# Patient Record
Sex: MALE | Race: WHITE | NOT HISPANIC OR LATINO | Employment: UNEMPLOYED | URBAN - METROPOLITAN AREA
[De-identification: names, ages, dates, MRNs, and addresses within clinical notes are randomized per-mention and may not be internally consistent; named-entity substitution may affect disease eponyms.]

---

## 2018-01-16 ENCOUNTER — HOSPITAL ENCOUNTER (INPATIENT)
Facility: HOSPITAL | Age: 23
LOS: 6 days | Discharge: HOME | DRG: 885 | End: 2018-01-22
Attending: EMERGENCY MEDICINE | Admitting: PSYCHIATRY & NEUROLOGY
Payer: COMMERCIAL

## 2018-01-16 DIAGNOSIS — F39 MOOD DISORDER (HCC): ICD-10-CM

## 2018-01-16 DIAGNOSIS — K92.1 BLOOD IN STOOL: ICD-10-CM

## 2018-01-16 DIAGNOSIS — I89.9 PERIPHERAL LYMPHATIC DISEASE OR SYNDROME: ICD-10-CM

## 2018-01-16 DIAGNOSIS — F32.A DEPRESSION WITH SUICIDAL IDEATION: ICD-10-CM

## 2018-01-16 DIAGNOSIS — K59.00 CONSTIPATION, UNSPECIFIED CONSTIPATION TYPE: ICD-10-CM

## 2018-01-16 DIAGNOSIS — F17.200 NICOTINE DEPENDENCE: ICD-10-CM

## 2018-01-16 DIAGNOSIS — B17.10 ACUTE HEPATITIS C VIRUS INFECTION WITHOUT HEPATIC COMA: ICD-10-CM

## 2018-01-16 DIAGNOSIS — M62.838 MUSCLE SPASM: ICD-10-CM

## 2018-01-16 DIAGNOSIS — R45.851 DEPRESSION WITH SUICIDAL IDEATION: ICD-10-CM

## 2018-01-16 DIAGNOSIS — F32.A DEPRESSION: ICD-10-CM

## 2018-01-16 DIAGNOSIS — R52 PAIN: ICD-10-CM

## 2018-01-16 DIAGNOSIS — F33.2 SEVERE EPISODE OF RECURRENT MAJOR DEPRESSIVE DISORDER, WITHOUT PSYCHOTIC FEATURES (HCC): Primary | ICD-10-CM

## 2018-01-16 LAB
AMPHETAMINES SERPL QL SCN: NEGATIVE
BARBITURATES UR QL: NEGATIVE
BENZODIAZ UR QL: NEGATIVE
COCAINE UR QL: NEGATIVE
ETHANOL EXG-MCNC: 0 MG/DL
METHADONE UR QL: NEGATIVE
OPIATES UR QL SCN: NEGATIVE
PCP UR QL: NEGATIVE
THC UR QL: NEGATIVE

## 2018-01-16 PROCEDURE — 99285 EMERGENCY DEPT VISIT HI MDM: CPT

## 2018-01-16 PROCEDURE — 82075 ASSAY OF BREATH ETHANOL: CPT | Performed by: EMERGENCY MEDICINE

## 2018-01-16 PROCEDURE — 80307 DRUG TEST PRSMV CHEM ANLYZR: CPT | Performed by: EMERGENCY MEDICINE

## 2018-01-16 RX ORDER — CLONIDINE HYDROCHLORIDE 0.3 MG/1
0.3 TABLET ORAL 2 TIMES DAILY
COMMUNITY
End: 2018-01-22 | Stop reason: HOSPADM

## 2018-01-16 RX ORDER — NICOTINE 21 MG/24HR
1 PATCH, TRANSDERMAL 24 HOURS TRANSDERMAL DAILY
Status: DISCONTINUED | OUTPATIENT
Start: 2018-01-17 | End: 2018-01-22 | Stop reason: HOSPADM

## 2018-01-16 RX ORDER — ACETAMINOPHEN 325 MG/1
650 TABLET ORAL EVERY 6 HOURS PRN
Status: DISCONTINUED | OUTPATIENT
Start: 2018-01-16 | End: 2018-01-16 | Stop reason: SDUPTHER

## 2018-01-16 RX ORDER — POLYETHYLENE GLYCOL 3350 17 G/17G
17 POWDER, FOR SOLUTION ORAL DAILY
COMMUNITY
End: 2018-01-22 | Stop reason: HOSPADM

## 2018-01-16 RX ORDER — IBUPROFEN 600 MG/1
600 TABLET ORAL EVERY 8 HOURS PRN
Status: DISCONTINUED | OUTPATIENT
Start: 2018-01-16 | End: 2018-01-22 | Stop reason: HOSPADM

## 2018-01-16 RX ORDER — MAGNESIUM HYDROXIDE/ALUMINUM HYDROXICE/SIMETHICONE 120; 1200; 1200 MG/30ML; MG/30ML; MG/30ML
30 SUSPENSION ORAL EVERY 4 HOURS PRN
Status: DISCONTINUED | OUTPATIENT
Start: 2018-01-16 | End: 2018-01-22 | Stop reason: HOSPADM

## 2018-01-16 RX ORDER — BUPRENORPHINE HYDROCHLORIDE AND NALOXONE HYDROCHLORIDE DIHYDRATE 8; 2 MG/1; MG/1
1 TABLET SUBLINGUAL DAILY
COMMUNITY
End: 2018-01-22 | Stop reason: HOSPADM

## 2018-01-16 RX ORDER — HALOPERIDOL 5 MG
5 TABLET ORAL EVERY 6 HOURS PRN
Status: DISCONTINUED | OUTPATIENT
Start: 2018-01-16 | End: 2018-01-17

## 2018-01-16 RX ORDER — LORAZEPAM 1 MG/1
1 TABLET ORAL EVERY 6 HOURS PRN
Status: DISCONTINUED | OUTPATIENT
Start: 2018-01-16 | End: 2018-01-17

## 2018-01-16 RX ORDER — GABAPENTIN 800 MG/1
800 TABLET ORAL 3 TIMES DAILY
Status: ON HOLD | COMMUNITY
End: 2018-01-22

## 2018-01-16 RX ORDER — IBUPROFEN 400 MG/1
800 TABLET ORAL EVERY 8 HOURS PRN
Status: DISCONTINUED | OUTPATIENT
Start: 2018-01-16 | End: 2018-01-22 | Stop reason: HOSPADM

## 2018-01-16 RX ORDER — TRAZODONE HYDROCHLORIDE 50 MG/1
50 TABLET ORAL
Status: DISCONTINUED | OUTPATIENT
Start: 2018-01-16 | End: 2018-01-17

## 2018-01-16 RX ORDER — HALOPERIDOL 5 MG/ML
5 INJECTION INTRAMUSCULAR EVERY 6 HOURS PRN
Status: DISCONTINUED | OUTPATIENT
Start: 2018-01-16 | End: 2018-01-17

## 2018-01-16 RX ORDER — METHOCARBAMOL 750 MG/1
750 TABLET, FILM COATED ORAL EVERY 4 HOURS
COMMUNITY
End: 2018-01-22 | Stop reason: HOSPADM

## 2018-01-16 RX ORDER — ACETAMINOPHEN 325 MG/1
650 TABLET ORAL EVERY 6 HOURS PRN
Status: DISCONTINUED | OUTPATIENT
Start: 2018-01-16 | End: 2018-01-17

## 2018-01-16 RX ADMIN — LORAZEPAM 1 MG: 1 TABLET ORAL at 21:37

## 2018-01-16 RX ADMIN — IBUPROFEN 600 MG: 600 TABLET ORAL at 21:58

## 2018-01-16 NOTE — ED ATTENDING ATTESTATION
Jarod Adame MD, saw and evaluated the patient  I have discussed the patient with the resident/non-physician practitioner and agree with the resident's/non-physician practitioner's findings, Plan of Care, and MDM as documented in the resident's/non-physician practitioner's note, except where noted  All available labs and Radiology studies were reviewed  At this point I agree with the current assessment done in the Emergency Department  I have conducted an independent evaluation of this patient including a focused history and a physical exam     72-year-old male presenting to the emergency department for evaluation of suicidal ideation  Previous suicidal attempts x2  Patient is planned this time was to rapid sheet around his neck which she did yesterday but then aborted his attempt  Patient has access to firearms  High risk for suicidality  Recommend crisis referral for admission

## 2018-01-16 NOTE — ED NOTES
Pt came to the ED from [facility] drug addiction [address] residential [address]  The pt has lived their for about 1 week  The pt was abusing pain killers and Benzos-Valium about 100 mg a day  The pt last use was about a week ago  The pt states that he has increase depression recently  The pt can't identify any stressors  The pt has been so depressed that he has lost 15 lbs in the past month from not eating  The pt is SI with a plan to hang himself  The pt last night made noose and put it around in his neck as SA gesture  The pt denies HI/AH/VH  The pt states that if he were to be DC he would definitely hurt himself  The pt offered and signed a 201  Wil Sox in agreement

## 2018-01-17 PROBLEM — R59.1 LYMPHADENOPATHY: Status: ACTIVE | Noted: 2018-01-17

## 2018-01-17 PROBLEM — B19.20 HEPATITIS C: Status: ACTIVE | Noted: 2018-01-17

## 2018-01-17 PROBLEM — F13.10 BENZODIAZEPINE ABUSE (HCC): Status: ACTIVE | Noted: 2018-01-17

## 2018-01-17 PROBLEM — F33.2 SEVERE EPISODE OF RECURRENT MAJOR DEPRESSIVE DISORDER, WITHOUT PSYCHOTIC FEATURES (HCC): Status: ACTIVE | Noted: 2018-01-17

## 2018-01-17 PROBLEM — K92.1 BLOODY STOOLS: Status: ACTIVE | Noted: 2018-01-17

## 2018-01-17 LAB
ALBUMIN SERPL BCP-MCNC: 4.1 G/DL (ref 3.5–5)
ALP SERPL-CCNC: 91 U/L (ref 46–116)
ALT SERPL W P-5'-P-CCNC: 650 U/L (ref 12–78)
ANION GAP SERPL CALCULATED.3IONS-SCNC: 6 MMOL/L (ref 4–13)
AST SERPL W P-5'-P-CCNC: 212 U/L (ref 5–45)
BASOPHILS # BLD AUTO: 0.02 THOUSANDS/ΜL (ref 0–0.1)
BASOPHILS NFR BLD AUTO: 0 % (ref 0–1)
BILIRUB SERPL-MCNC: 0.86 MG/DL (ref 0.2–1)
BILIRUB UR QL STRIP: ABNORMAL
BUN SERPL-MCNC: 12 MG/DL (ref 5–25)
CALCIUM SERPL-MCNC: 9.7 MG/DL (ref 8.3–10.1)
CHLORIDE SERPL-SCNC: 104 MMOL/L (ref 100–108)
CHOLEST SERPL-MCNC: 152 MG/DL (ref 50–200)
CLARITY UR: CLEAR
CO2 SERPL-SCNC: 30 MMOL/L (ref 21–32)
COLOR UR: YELLOW
CREAT SERPL-MCNC: 0.95 MG/DL (ref 0.6–1.3)
EOSINOPHIL # BLD AUTO: 0.12 THOUSAND/ΜL (ref 0–0.61)
EOSINOPHIL NFR BLD AUTO: 2 % (ref 0–6)
ERYTHROCYTE [DISTWIDTH] IN BLOOD BY AUTOMATED COUNT: 12.7 % (ref 11.6–15.1)
GFR SERPL CREATININE-BSD FRML MDRD: 114 ML/MIN/1.73SQ M
GLUCOSE P FAST SERPL-MCNC: 86 MG/DL (ref 65–99)
GLUCOSE SERPL-MCNC: 86 MG/DL (ref 65–140)
GLUCOSE UR STRIP-MCNC: NEGATIVE MG/DL
HCT VFR BLD AUTO: 44.4 % (ref 36.5–49.3)
HDLC SERPL-MCNC: 52 MG/DL (ref 40–60)
HGB BLD-MCNC: 15.7 G/DL (ref 12–17)
HGB UR QL STRIP.AUTO: NEGATIVE
KETONES UR STRIP-MCNC: NEGATIVE MG/DL
LDLC SERPL CALC-MCNC: 78 MG/DL (ref 0–100)
LEUKOCYTE ESTERASE UR QL STRIP: NEGATIVE
LYMPHOCYTES # BLD AUTO: 1.73 THOUSANDS/ΜL (ref 0.6–4.47)
LYMPHOCYTES NFR BLD AUTO: 35 % (ref 14–44)
MCH RBC QN AUTO: 32.9 PG (ref 26.8–34.3)
MCHC RBC AUTO-ENTMCNC: 35.4 G/DL (ref 31.4–37.4)
MCV RBC AUTO: 93 FL (ref 82–98)
MONOCYTES # BLD AUTO: 0.49 THOUSAND/ΜL (ref 0.17–1.22)
MONOCYTES NFR BLD AUTO: 10 % (ref 4–12)
NEUTROPHILS # BLD AUTO: 2.54 THOUSANDS/ΜL (ref 1.85–7.62)
NEUTS SEG NFR BLD AUTO: 53 % (ref 43–75)
NITRITE UR QL STRIP: NEGATIVE
NRBC BLD AUTO-RTO: 0 /100 WBCS
PH UR STRIP.AUTO: 6 [PH] (ref 4.5–8)
PLATELET # BLD AUTO: 210 THOUSANDS/UL (ref 149–390)
PMV BLD AUTO: 9.2 FL (ref 8.9–12.7)
POTASSIUM SERPL-SCNC: 4.2 MMOL/L (ref 3.5–5.3)
PROT SERPL-MCNC: 8.2 G/DL (ref 6.4–8.2)
PROT UR STRIP-MCNC: NEGATIVE MG/DL
RBC # BLD AUTO: 4.77 MILLION/UL (ref 3.88–5.62)
SODIUM SERPL-SCNC: 140 MMOL/L (ref 136–145)
SP GR UR STRIP.AUTO: 1.02 (ref 1–1.03)
TRIGL SERPL-MCNC: 109 MG/DL
TSH SERPL DL<=0.05 MIU/L-ACNC: 1.3 UIU/ML (ref 0.36–3.74)
UROBILINOGEN UR QL STRIP.AUTO: 1 E.U./DL
WBC # BLD AUTO: 4.91 THOUSAND/UL (ref 4.31–10.16)

## 2018-01-17 PROCEDURE — 81003 URINALYSIS AUTO W/O SCOPE: CPT | Performed by: PSYCHIATRY & NEUROLOGY

## 2018-01-17 PROCEDURE — 86592 SYPHILIS TEST NON-TREP QUAL: CPT | Performed by: PSYCHIATRY & NEUROLOGY

## 2018-01-17 PROCEDURE — 85025 COMPLETE CBC W/AUTO DIFF WBC: CPT | Performed by: PSYCHIATRY & NEUROLOGY

## 2018-01-17 PROCEDURE — 84443 ASSAY THYROID STIM HORMONE: CPT | Performed by: PSYCHIATRY & NEUROLOGY

## 2018-01-17 PROCEDURE — 80053 COMPREHEN METABOLIC PANEL: CPT | Performed by: PSYCHIATRY & NEUROLOGY

## 2018-01-17 PROCEDURE — 80061 LIPID PANEL: CPT | Performed by: PSYCHIATRY & NEUROLOGY

## 2018-01-17 PROCEDURE — 86308 HETEROPHILE ANTIBODY SCREEN: CPT | Performed by: PSYCHIATRY & NEUROLOGY

## 2018-01-17 RX ORDER — GABAPENTIN 400 MG/1
800 CAPSULE ORAL 3 TIMES DAILY
Status: DISCONTINUED | OUTPATIENT
Start: 2018-01-17 | End: 2018-01-22 | Stop reason: HOSPADM

## 2018-01-17 RX ORDER — DIPHENHYDRAMINE HCL 25 MG
25 TABLET ORAL
Status: DISCONTINUED | OUTPATIENT
Start: 2018-01-17 | End: 2018-01-22 | Stop reason: HOSPADM

## 2018-01-17 RX ORDER — CLONIDINE HYDROCHLORIDE 0.3 MG/1
0.3 TABLET ORAL
Status: DISCONTINUED | OUTPATIENT
Start: 2018-01-17 | End: 2018-01-22 | Stop reason: HOSPADM

## 2018-01-17 RX ORDER — METHOCARBAMOL 750 MG/1
750 TABLET, FILM COATED ORAL EVERY 4 HOURS
Status: DISCONTINUED | OUTPATIENT
Start: 2018-01-17 | End: 2018-01-17

## 2018-01-17 RX ORDER — LANOLIN ALCOHOL/MO/W.PET/CERES
3 CREAM (GRAM) TOPICAL
Status: DISCONTINUED | OUTPATIENT
Start: 2018-01-17 | End: 2018-01-22 | Stop reason: HOSPADM

## 2018-01-17 RX ORDER — CARBAMAZEPINE 200 MG/1
200 TABLET ORAL 2 TIMES DAILY
Status: DISCONTINUED | OUTPATIENT
Start: 2018-01-17 | End: 2018-01-22 | Stop reason: HOSPADM

## 2018-01-17 RX ORDER — CLONIDINE HYDROCHLORIDE 0.1 MG/1
0.1 TABLET ORAL 2 TIMES DAILY
Status: DISCONTINUED | OUTPATIENT
Start: 2018-01-17 | End: 2018-01-17

## 2018-01-17 RX ORDER — METHOCARBAMOL 750 MG/1
750 TABLET, FILM COATED ORAL EVERY 4 HOURS PRN
Status: DISCONTINUED | OUTPATIENT
Start: 2018-01-17 | End: 2018-01-20

## 2018-01-17 RX ORDER — CLONIDINE HYDROCHLORIDE 0.3 MG/1
0.15 TABLET ORAL 2 TIMES DAILY
Status: DISCONTINUED | OUTPATIENT
Start: 2018-01-17 | End: 2018-01-22 | Stop reason: HOSPADM

## 2018-01-17 RX ORDER — LORAZEPAM 1 MG/1
1 TABLET ORAL EVERY 4 HOURS PRN
Status: DISCONTINUED | OUTPATIENT
Start: 2018-01-17 | End: 2018-01-22 | Stop reason: HOSPADM

## 2018-01-17 RX ORDER — POLYETHYLENE GLYCOL 3350 17 G/17G
17 POWDER, FOR SOLUTION ORAL DAILY
Status: DISCONTINUED | OUTPATIENT
Start: 2018-01-17 | End: 2018-01-20

## 2018-01-17 RX ORDER — DULOXETIN HYDROCHLORIDE 20 MG/1
20 CAPSULE, DELAYED RELEASE ORAL DAILY
Status: DISCONTINUED | OUTPATIENT
Start: 2018-01-17 | End: 2018-01-19

## 2018-01-17 RX ORDER — OLANZAPINE 10 MG/1
10 INJECTION, POWDER, LYOPHILIZED, FOR SOLUTION INTRAMUSCULAR
Status: DISCONTINUED | OUTPATIENT
Start: 2018-01-17 | End: 2018-01-22 | Stop reason: HOSPADM

## 2018-01-17 RX ORDER — IBUPROFEN 400 MG/1
400 TABLET ORAL EVERY 8 HOURS PRN
Status: DISCONTINUED | OUTPATIENT
Start: 2018-01-17 | End: 2018-01-22 | Stop reason: HOSPADM

## 2018-01-17 RX ORDER — OLANZAPINE 10 MG/1
10 TABLET, ORALLY DISINTEGRATING ORAL
Status: DISCONTINUED | OUTPATIENT
Start: 2018-01-17 | End: 2018-01-22 | Stop reason: HOSPADM

## 2018-01-17 RX ORDER — ONDANSETRON 4 MG/1
4 TABLET, ORALLY DISINTEGRATING ORAL EVERY 6 HOURS PRN
Status: DISCONTINUED | OUTPATIENT
Start: 2018-01-17 | End: 2018-01-22 | Stop reason: HOSPADM

## 2018-01-17 RX ADMIN — IBUPROFEN 600 MG: 600 TABLET ORAL at 21:43

## 2018-01-17 RX ADMIN — GABAPENTIN 800 MG: 400 CAPSULE ORAL at 21:13

## 2018-01-17 RX ADMIN — DULOXETINE HYDROCHLORIDE 20 MG: 20 CAPSULE, DELAYED RELEASE ORAL at 13:50

## 2018-01-17 RX ADMIN — METHOCARBAMOL 750 MG: 750 TABLET ORAL at 19:05

## 2018-01-17 RX ADMIN — GABAPENTIN 800 MG: 400 CAPSULE ORAL at 15:45

## 2018-01-17 RX ADMIN — CARBAMAZEPINE 200 MG: 200 TABLET ORAL at 13:50

## 2018-01-17 RX ADMIN — NICOTINE 1 PATCH: 21 PATCH, EXTENDED RELEASE TRANSDERMAL at 10:10

## 2018-01-17 RX ADMIN — CLONIDINE HYDROCHLORIDE 0.15 MG: 0.3 TABLET ORAL at 13:53

## 2018-01-17 RX ADMIN — POLYETHYLENE GLYCOL 3350 17 G: 17 POWDER, FOR SOLUTION ORAL at 13:13

## 2018-01-17 RX ADMIN — CLONIDINE HYDROCHLORIDE 0.3 MG: 0.3 TABLET ORAL at 21:15

## 2018-01-17 RX ADMIN — MELATONIN TAB 3 MG 3 MG: 3 TAB at 21:13

## 2018-01-17 RX ADMIN — CLONIDINE HYDROCHLORIDE 0.15 MG: 0.3 TABLET ORAL at 18:24

## 2018-01-17 NOTE — ED NOTES
Pt told CW that his Social security# is   CW checked EVS and pt is not eligible for MA  CW completed pre-cert with [employer] private insurance at [phone]   Wally Mikey stated that the [facility] is in network with this insurance  Care manager Cal Silveira approved 3 days of IP [employer] [facility] [place] 1/16/18 to 1/18/18  Review is on 1/18/18 has to be done between 800 am to 5 pm  AUTH#[id number]

## 2018-01-17 NOTE — CONSULTS
Consult- Ebony Horn 1995, 25 y o  male MRN: [id number]    Unit/Bed#: SC5 305-39 Encounter: [id number]    Primary Care Provider: Ирина Pop MD   Date and time admitted to hospital: 1/16/2018  4:35 PM      Inpatient consult to Internal Medicine  Consult performed by: Cinthia Grimm ordered by: Daryn Abdul          Hepatitis C   Assessment & Plan    · Recommended outpatient GI referral  · Stable        Bloody stools   Assessment & Plan    · Patient states he has had this for multiple years  · Suspect hemorrhoidal  · Hemoglobin stable  · Check FOBT  · Outpatient follow up with PCP/GI        Lymphadenopathy   Assessment & Plan    · Present for last few months  · Do not suspect acute illness - mono testing ordered by behavioral health, but doubt at this point  · Family doctor was in the process of an outpatient workup - would have family doctor continue workup as outpatient  · No further intervention required as inpatient  Benzodiazepine abuse   Assessment & Plan    · Per primary team        * Severe episode of recurrent major depressive disorder, without psychotic features [facility])   Assessment & Plan    · Per primary team              Recommendations for Discharge:  · Needs follow up with family doctor and referral to GI specialist    Counseling / Coordination of Care Time: 45 minutes  Greater than 50% of total time spent on patient counseling and coordination of care  Collaboration of Care: Were Recommendations Directly Discussed with Primary Treatment Team? - No     History of Present Illness:    Ebony Horn is a 25 y o  male who is originally admitted to the behavioral health service on 1/16/2018 due to SI  We are consulted for bloody stools, hepatitis C, and neck lymphadenopathy  Patient states he has bloody stools for years  Bright red blood  He states he was evaluated before and no blood was found  Patinet also noting 3 lymph nodes on his neck   His family doctor suggested a workup, but the patient states he did not do it yet  Patient also has a known history of Hepatitis C with mildly elevated LFT's  Review of Systems:    Review of Systems   Constitutional: Negative for activity change, appetite change, chills, diaphoresis, fatigue and fever  HENT: Negative for rhinorrhea, sinus pressure, sneezing and sore throat  Eyes: Negative for photophobia, redness and visual disturbance  Respiratory: Negative for cough, chest tightness, shortness of breath and wheezing  Cardiovascular: Negative for chest pain, palpitations and leg swelling  Gastrointestinal: Positive for blood in stool  Negative for abdominal distention, abdominal pain, anal bleeding, constipation, diarrhea, nausea and vomiting  Genitourinary: Negative for difficulty urinating, dysuria, flank pain, frequency, hematuria and urgency  Musculoskeletal: Negative for back pain and gait problem  Skin: Negative for rash and wound  Allergic/Immunologic: Negative for immunocompromised state  Neurological: Negative for dizziness, tremors, seizures, syncope, facial asymmetry, speech difficulty, weakness, light-headedness, numbness and headaches  Hematological: Negative for adenopathy  Psychiatric/Behavioral: Positive for dysphoric mood  Negative for agitation, confusion and hallucinations  The patient is not nervous/anxious  Past Medical and Surgical History:     Past Medical History:   Diagnosis Date    Addiction to drug ([facility] [address] )     Anxiety     Chronic pain disorder     Depression     Heart disease     pt states "valvular heart disease"    Hepatitis C 1/17/2018    Muscle spasm     Psychiatric illness     Sciatica     Scoliosis     Sleep difficulties     Substance abuse     Suicide attempt        Past Surgical History:   Procedure Laterality Date    WISDOM TOOTH EXTRACTION         Meds/Allergies:    all medications and allergies reviewed    Allergies:    Allergies   Allergen Reactions    Seroquel [Quetiapine] Tachycardia    Trazodone Tachycardia       Social History:     Marital Status: Single    Substance Use History:   History   Alcohol Use No     History   Smoking Status    Current Every Day Smoker    Packs/day: 1 00   Smokeless Tobacco    Never Used     History   Drug Use    Types: Heroin, Benzodiazepines, Other     Comment: stopped using benzo and prescription pain medication Jan 2018       Family History:    History reviewed  No pertinent family history  Physical Exam:     Vitals:   Blood Pressure: 122/84 (01/17/18 1353)  Pulse: 81 (01/17/18 1105)  Temperature: 97 7 °F (36 5 °C) (01/17/18 0844)  Temp Source: Oral (01/17/18 0844)  Respirations: 16 (01/17/18 0844)  Height: 6' (182 9 cm) (01/16/18 2018)  Weight - Scale: 68 kg (150 lb) (01/16/18 2018)  SpO2: 100 % (01/16/18 1819)    Physical Exam   Constitutional: He is oriented to person, place, and time  He appears well-developed  No distress  HENT:   Head: Normocephalic and atraumatic  Neck: Normal range of motion  Neck supple  Cardiovascular: Normal rate and regular rhythm  No murmur heard  Pulmonary/Chest: Effort normal and breath sounds normal  No respiratory distress  He has no wheezes  He has no rales  Abdominal: Soft  Bowel sounds are normal  He exhibits no distension  Musculoskeletal: He exhibits no edema  Lymphadenopathy:     He has cervical adenopathy (2 cerviacal nodes on left and 1 on right  Non-tender to palpation)  Neurological: He is alert and oriented to person, place, and time  No cranial nerve deficit  Skin: Skin is warm and dry  No rash noted  Psychiatric: He has a normal mood and affect  Additional Data:     Lab Results: I have personally reviewed pertinent reports          Results from last 7 days  Lab Units 01/17/18  0557   WBC Thousand/uL 4 91   HEMOGLOBIN g/dL 15 7   HEMATOCRIT % 44 4   PLATELETS Thousands/uL 210   NEUTROS PCT % 53   LYMPHS PCT % 35   MONOS PCT % 10   EOS PCT % 2       Results from last 7 days  Lab Units 01/17/18  0557   SODIUM mmol/L 140   POTASSIUM mmol/L 4 2   CHLORIDE mmol/L 104   CO2 mmol/L 30   BUN mg/dL 12   CREATININE mg/dL 0 95   CALCIUM mg/dL 9 7   TOTAL PROTEIN g/dL 8 2   BILIRUBIN TOTAL mg/dL 0 86   ALK PHOS U/L 91   ALT U/L 650*   AST U/L 212*   GLUCOSE RANDOM mg/dL 86             No results found for: HGBA1C    Imaging: I have personally reviewed pertinent reports  No orders to display       EKG, Pathology, and Other Studies Reviewed on Admission:       ** Please Note: This note has been constructed using a voice recognition system   **

## 2018-01-17 NOTE — PROGRESS NOTES
Patient currently denies SI  Reports depression  States that he has had past admissions for depression  Currently residing at [facility]- but states that he is not going to return  He and his father will be finding a new rehab for patient  " That place just isn't for me"  Compliant with medications, pleasant

## 2018-01-17 NOTE — PROGRESS NOTES
While going through Pt's belongings, a broken piece of glass was found in his coat pocket  The piece was roughly an inch in diameter with jagged edges on all sides  The glass was put in the sharps container and nurse was informed

## 2018-01-17 NOTE — CONSULTS
Consult   Wisam Araya 25 y o  male MRN: [id number]  Unit/Bed#: SG5 213-02 Encounter: [id number]    History of Present Illness     HPI:  Wisam Araya is a 25 y o  male who presents with depression and suicidal ideations           Review of Systems   Constitutional: Negative  HENT: Negative  Eyes: Negative  Respiratory: Negative  Cardiovascular: Negative  Gastrointestinal: Negative  Endocrine: Negative  Genitourinary: Negative  Musculoskeletal: Negative  Skin: Negative  Allergic/Immunologic: Negative  Neurological: Negative  Hematological: Negative  Psychiatric/Behavioral: Positive for confusion, decreased concentration, dysphoric mood and suicidal ideas  Historical Information   Past Medical History:   Diagnosis Date    Addiction to drug ([address] )     Anxiety     Chronic pain disorder     Depression     Heart disease     pt states "valvular heart disease"    Muscle spasm     Psychiatric illness     Sciatica     Scoliosis     Sleep difficulties     Substance abuse     Suicide attempt      Past Surgical History:   Procedure Laterality Date    WISDOM TOOTH EXTRACTION       Social History   History   Alcohol Use No     History   Drug Use    Types: Heroin, Benzodiazepines, Other     Comment: stopped using benzo and prescription pain medication Jan 2018     History   Smoking Status    Current Every Day Smoker    Packs/day: 1 00   Smokeless Tobacco    Never Used     Family History: History reviewed  No pertinent family history      Meds/Allergies   Current Facility-Administered Medications   Medication Dose Route Frequency    acetaminophen (TYLENOL) tablet 650 mg  650 mg Oral Q6H PRN    aluminum-magnesium hydroxide-simethicone (MYLANTA) 200-200-20 mg/5 mL oral suspension 30 mL  30 mL Oral Q4H PRN    haloperidol (HALDOL) tablet 5 mg  5 mg Oral Q6H PRN    haloperidol lactate (HALDOL) injection 5 mg  5 mg Intramuscular Q6H PRN    ibuprofen (MOTRIN) tablet 600 mg  600 mg Oral Q8H PRN    ibuprofen (MOTRIN) tablet 800 mg  800 mg Oral Q8H PRN    LORazepam (ATIVAN) tablet 1 mg  1 mg Oral Q6H PRN    magnesium hydroxide (MILK OF MAGNESIA) 400 mg/5 mL oral suspension 30 mL  30 mL Oral Daily PRN    [START ON 1/17/2018] nicotine (NICODERM CQ) 21 mg/24 hr TD 24 hr patch 1 patch  1 patch Transdermal Daily    traZODone (DESYREL) tablet 50 mg  50 mg Oral HS PRN     Prescriptions Prior to Admission   Medication    buprenorphine-naloxone (SUBOXONE) 8-2 mg per SL tablet    cloNIDine (CATAPRES) 0 3 mg tablet    gabapentin (NEURONTIN) 800 mg tablet    methocarbamol (ROBAXIN) 750 mg tablet    polyethylene glycol (MIRALAX) 17 g packet     Allergies   Allergen Reactions    Seroquel [Quetiapine] Tachycardia    Trazodone Tachycardia       Objective     /69   Pulse (!) 52   Temp 97 9 °F (36 6 °C) (Oral)   Resp 16   Ht 6' (1 829 m)   Wt 68 kg (150 lb)   SpO2 100%   BMI 20 34 kg/m²     Current Vitals:   Blood Pressure: 124/69 (01/16/18 2018)  Pulse: (!) 52 (01/16/18 2018)  Temperature: 97 9 °F (36 6 °C) (01/16/18 2018)  Temp Source: Oral (01/16/18 2018)  Respirations: 16 (01/16/18 2018)  Height: 6' (182 9 cm) (01/16/18 2018)  Weight - Scale: 68 kg (150 lb) (01/16/18 2018)  SpO2: 100 % (01/16/18 1819)    No intake or output data in the 24 hours ending 01/16/18 2155    Invasive Devices          No matching active lines, drains, or airways          Physical Exam   Constitutional: He is oriented to person, place, and time  He appears well-developed and well-nourished  No distress  HENT:   Head: Normocephalic and atraumatic  Right Ear: External ear normal    Left Ear: External ear normal    Nose: Nose normal    Mouth/Throat: Oropharynx is clear and moist    Eyes: Conjunctivae and EOM are normal  Pupils are equal, round, and reactive to light  Neck: Normal range of motion  Neck supple  No JVD present  No tracheal deviation present  No thyromegaly present  Cardiovascular: Normal rate, regular rhythm, normal heart sounds and intact distal pulses  Exam reveals no gallop and no friction rub  No murmur heard  Pulmonary/Chest: Effort normal and breath sounds normal  No stridor  No respiratory distress  He has no wheezes  He has no rales  He exhibits no tenderness  Abdominal: Soft  Bowel sounds are normal  He exhibits no distension and no mass  There is no tenderness  There is no rebound and no guarding  Genitourinary:   Genitourinary Comments: Deferred    Musculoskeletal: Normal range of motion  He exhibits no edema, tenderness or deformity  Lymphadenopathy:     He has no cervical adenopathy  Neurological: He is alert and oriented to person, place, and time  He has normal reflexes  He displays normal reflexes  No cranial nerve deficit  He exhibits normal muscle tone  Coordination normal    Skin: Skin is warm and dry  No rash noted  He is not diaphoretic  No erythema  No pallor  Psychiatric: His mood appears anxious  His affect is blunt  His speech is delayed and slurred  He is slowed and withdrawn  Cognition and memory are impaired  He expresses impulsivity  He exhibits a depressed mood  He expresses suicidal ideation  Lab Results:   Admission on 01/16/2018   Component Date Value    Amph/Meth UR 01/16/2018 Negative     Barbiturate Ur 01/16/2018 Negative     Benzodiazepine Urine 01/16/2018 Negative     Cocaine Urine 01/16/2018 Negative     Methadone Urine 01/16/2018 Negative     Opiate Urine 01/16/2018 Negative     PCP Ur 01/16/2018 Negative     THC Urine 01/16/2018 Negative     EXTBreath Alcohol 01/16/2018 0 000        EKG, Pathology, and Other Studies: I have personally reviewed pertinent reports  Assessment/Plan     Assessment:  1  Depression and suicidal ideations  Plan:  1  Admit for Psych eval/treatment          Counseling / Coordination of Care  Total floor / unit time spent today 1 hour  Greater than 50% of total time was spent with the patient and / or family counseling and / or coordination of care        Batsheva Stallings PA-C  1/16/2018

## 2018-01-17 NOTE — ASSESSMENT & PLAN NOTE
· Present for last few months  · Do not suspect acute illness - mono testing ordered by behavioral health, but doubt at this point  · Family doctor was in the process of an outpatient workup - would have family doctor continue workup as outpatient  · No further intervention required as inpatient

## 2018-01-17 NOTE — ED NOTES
Pt has been accepted to [facility] by Dr Patrick Bassett    Nurse to nurse report needs to be called to [facility] at [phone]

## 2018-01-17 NOTE — CASE MANAGEMENT
Pt presented to [facility] on a 201 due to increased depression with ideations to hang himself; he reports an extensive psychiatric history since the age of 16 and polysubstance abuse  Pt states his father, whom he had lived with, dropped him off at [facility] and insisted he gets help for addiction problems that range from prescription opiod and benzo abuse to herion - as per pt  Pt is hepatitis C positive  Pt states he became a drug addict at age 13 when he was first prescribed clonopin and it felt so "wonderful" to him and took away all the feelings he hated and he therefore became an addict on benzos and opioid pain killers (favorite is percocet)  Pt's father is a retired  and "pulls strings" at times to benefit pt (as per pt)  Pt states his household contains guns, father advised to secure guns and/or remove guns  Pt states he will not return to [facility], that it was a bad environment and no help whatsoever and that his father is finding him another place to go after his discharge

## 2018-01-17 NOTE — PROGRESS NOTES
Pt about unit and social with peers  Affect remains blunted  Pt denies SI, remains depressed  Denies and w/d symptoms  Education on new medications given and pt reading and encouraged to come to RN with any questions  Compliant with meds and care  Will provide hat to obtain stool sample for occult blood as per medical team  Will monitor

## 2018-01-17 NOTE — H&P
Psychiatric Evaluation - Behavioral Health     Identification Data:Robert Cutler 25 y o  male MRN: [id number]  Unit/Bed#: BM6 866-51 Encounter: [id number]    Chief Complaint: "I wanted to die", "I don't want to go on living like this", depression and suicidal ideation    History of Present Illness     Dariusz Capone is a 25 y o  male with a history of Major Depressive Disorder, psychosis, anxiety, self-abusive behavior, substance use and personality disorder who was admitted to the inpatient psychiatric unit on a voluntary 201 commitment basis due to depression, anxiety, substance abuse, inability to care for self because of mental illness, suicidal ideation and suicidal ideation with plan to hang self  Symptoms prior to admission included worsening depression, depression, feeling depressed, suicidal ideation, hopelessness, helplessness, poor concentration, poor appetite, weight loss and difficulty sleeping  Onset of symptoms was abrupt starting 1 week ago with progressively worsening course since that time  Stressors preceding admission included drug use problems  On initial evaluation after admission to the inpatient psychiatric unit the patient depressed, anxious, had restricted affect telling that in addition to his depression he also suffered from mild withdrawal from Suboxone his stop several days ago after he was admitted to [facility]rug [facility] alcohol residential program   The patient did not like that program over and relapsed on benzodiazepines  The patient stated that he used to bite different benzodiazepines from the Internet and recall completed the strained and dosages to adjust to volume but the patient know how to how much to use to get high but not overdosing  The patient also stated had history of heroin addiction    Had 1 suicidal attempt in the past by OD on benzos and prescriprionn medications and after 24 hours observtions was referred to outpatient psych at    Prescription opioids - lost control since 24 y o  No other drugs - opioid and benzodiazepine  Natural high - a couple of day a month   Depression improved on pain killer  Insomnia since 14 y,o  Clonidine 0 3 hs, 0 15 bid  Neurontin 800 mg tid -     Back pain - muscle spasm syndrome  Robaxin  coreyretre - 10 a day  High school grad  2-3 jihs - in the last two years  Not working  Psychiatric Review Of Systems:    sleep changes: decreased  appetite changes: decreased  weight changes: decreased  energy/anergy: decreased  interest/pleasure/anhedonia: decreased  somatic symptoms: no  anxiety/panic: yes  jose: history of mood swings  guilty/hopeless: yes  self injurious behavior/risky behavior: yes  Suicidal ideation: yes, plan to hang self  Homicidal ideation: no  Auditory hallucinations: past auditory hallucinations  Visual hallucinations: no  Other hallucinations: no  Delusional thinking: no  Eating disorder history: no  Obsessive/compulsive symptoms: no    Historical Information     Past Psychiatric History:     Past Inpatient Psychiatric Treatment:   One past inpatient psychiatric admission  Past Outpatient Psychiatric Treatment:    Was in outpatient psychiatric treatment in the past with a psychiatrist   Past Suicide Attempts:    yes, by overdose, street drugs [place] and benzos  Past Violent Behavior:    no  Past Psychiatric Medication Trials:    multiple psychiatric medication trials   Psychiatrist - for three years: prozac, celexa, welbutrin, elavil, paxil, Effexor, Voilent and aggressive on antidepressant  Easily agitted  razadoen an seroquel - tachicarda  MUcle lock up  Heart disease  No cymbalta  Father - Curlene Spatz       Substance Abuse History:  Social History     Tobacco History     Smoking Status  Current Every Day Smoker Smoking Frequency  1 pack/day    Smokeless Tobacco Use  Never Used          Alcohol History     Alcohol Use Status  No          Drug Use     Drug Use Status  Yes Types  Benzodiazepines, Heroin, Other Comment  stopped using benzo and prescription pain medication Jan 2018          Sexual Activity     Sexually Active  Not Currently Partners  Female          Activities of Daily Living    Not Asked               Additional Substance Use Detail     Questions Responses    Substance Use Assessment Substance use within the past 12 months    Alcohol Use Frequency Denies use in past 12 months    Cannabis frequency Never used    Comment: Never used on 1/16/2018     Heroin Frequency Past abuse    Heroin Method IV    Heroin Last Use & Amount 3 bags/week sometimes    Cocaine frequency Never used    Comment: Never used on 1/16/2018     Crack Cocaine Frequency Denies use in past 12 months    Methamphetamine Frequency Denies use in past 12 months    Narcotic Frequency Denies use in past 12 months    Benzodiazepine Frequency Denies use in past 12 months    Amphetamine frequency Denies use in past 12 months    Benzodiazepine Method Pill    Benzodiazepine 1st Use since teenager    Barbituate Frequency Denies use use in past 12 months    Inhalant frequency Never used    Comment: Never used on 1/16/2018     Hallucinogen frequency Never used    Comment: Never used on 1/16/2018     Ecstasy frequency Never used    Comment: Never used on 1/16/2018     Other drug frequency Never used    Comment: Never used on 1/16/2018     Opiate frequency Prior dependence    Opiate last use 1/9/18    Comment: 1/9/2018 on 1/16/2018     Last reviewed by Oleg Esparza RN on 1/16/2018        I have assessed this patient for substance use within the past 12 months    Alcohol use: occasional, social use  Recreational drug use:   Cocaine:  denies current use  Heroin:  none currently, history of past use  Marijuana:  none currently  Other drugs: Benzodiazepines: current use   History of Inpatient/Outpatient rehabilitation program: no  Smoking history: 1/2 pack per day    Family Psychiatric History:     Psychiatric Illness:  Father - depression and Mood Disorder impulse control disorder  Substance Abuse:  unknown  Suicide Attempts:  patient denies    Social History:    Education: GED  Marital History: single  Children: none  Living Arrangement: The patient lives in home with father  Occupational History: currently unemployed          Traumatic History:     Abuse: not willing to provide details    Past Medical History:    History of Seizures: yes  History of Head injury with loss of consciousness: no    Past Medical History:   Diagnosis Date    Addiction to drug ([address] )     Anxiety     Chronic pain disorder     Depression     Heart disease     pt states "valvular heart disease"    Muscle spasm     Psychiatric illness     Sciatica     Scoliosis     Sleep difficulties     Substance abuse     Suicide attempt      Past Surgical History:   Procedure Laterality Date    WISDOM TOOTH EXTRACTION         Medical Review Of Systems:    Respiratory: negative  Cardiovascular: negative  Gastrointestinal: positive for constipation  Musculoskeletal:positive for myalgias  Neurological: negative  Endocrine: negative    Allergies: Allergies   Allergen Reactions    Seroquel [Quetiapine] Tachycardia    Trazodone Tachycardia       Medications: All current active medications have been reviewed      Objective     Vital signs in last 24 hours:    Temp:  [97 7 °F (36 5 °C)-98 °F (36 7 °C)] 97 7 °F (36 5 °C)  HR:  [52-93] 81  Resp:  [16-18] 16  BP: (103-124)/(59-75) 120/75    No intake or output data in the 24 hours ending 01/17/18 1215     Mental Status Evaluation:      Appearance:  dressed appropriately, casually dressed   Behavior:  cooperative, calm   Mood:  depressed, anxious   Affect: constricted    Speech:  decreased rate   Language: appropriate   Thought Process:  concrete   Associations: concrete associations   Thought Content:  negative thinking   Perceptual Disturbances: no auditory hallucinations, no visual hallucinations Risk Potential: Suicidal ideation - None at present  Homicidal ideation - None  Potential for aggression - No   Sensorium:  oriented to person, place and time   Memory:  recent and remote memory grossly intact   Consciousness:  alert and awake   Attention: attention span and concentration are normal   Intellect: normal   Fund of Knowledge: awareness of current events appropriate   Insight:  impaired   Judgment: limited   Muscle Tone: normal   Gait/Station: normal gait/station and normal balance   Motor Activity: no abnormal movements               Laboratory Results:   I have personally reviewed all pertinent laboratory/tests results  Most Recent Labs:   Lab Results   Component Value Date    WBC 4 91 01/17/2018    RBC 4 77 01/17/2018    HGB 15 7 01/17/2018    HCT 44 4 01/17/2018     01/17/2018    RDW 12 7 01/17/2018    NEUTROABS 2 54 01/17/2018     01/17/2018    K 4 2 01/17/2018     01/17/2018    CO2 30 01/17/2018    BUN 12 01/17/2018    CREATININE 0 95 01/17/2018    GLUCOSE 86 01/17/2018    CALCIUM 9 7 01/17/2018     (H) 01/17/2018     (H) 01/17/2018    ALKPHOS 91 01/17/2018    PROT 8 2 01/17/2018    ALBUMIN 4 1 01/17/2018    BILITOT 0 86 01/17/2018    CHOL 152 01/17/2018    HDL 52 01/17/2018    TRIG 109 01/17/2018    LDLCALC 78 01/17/2018    RRF1VGNCIFUC 1 300 01/17/2018       Imaging Studies: No results found      Code Status: Level 1 - Full Code  Advance Directive and Living Will: <no information>    Assessment/Plan   Principal Problem:    Severe episode of recurrent major depressive disorder, without psychotic features ([facility] [address] )  Active Problems:    Benzodiazepine abuse      Patient Strengths: cooperative, communication skills, general fund of knowledge, negotiates basic needs, patient is on a voluntary commitment     Patient Barriers: limited education, limited family ties, poor support system, self-care deficit, substance abuse    Treatment Plan:     Planned Treatment and Medication Changes: All current active medications have been reviewed  Start a combination of Cymbalta that help his father to regain control over his mood and anger, and Tegretol 200 mg twice a day to address mood swings and post-acute withdrawal symptoms  Continue clonidine that might be helpful for Suboxone withdrawal, and continue the rest of his medications, some of them like Robaxin giving as needed  Will review this patient is symptoms tomorrow  Request medicine consult to address several medical issues including but not limited to do a petit in his neck, blood in his rectum, chronic constipation  Laboratory data were reviewed  Motivational therapy to support relapse prevention was provided  Patient was receptive     gabapentin 800 mg Oral TID   nicotine 1 patch Transdermal Daily       Risks / Benefits of Treatment:    Risks, benefits, and possible side effects of medications explained to patient and patient verbalizes understanding and agreement for treatment  Counseling / Coordination of Care:    Patient's presentation on admission and proposed treatment plan discussed with treatment team   Importance of medication and treatment compliance reviewed with patient  Discussed with patient need for drug and alcohol counseling after discharge  Outpatient follow up discussed with patient  Supportive therapy provided to patient  Inpatient Psychiatric Certification:    Estimated length of stay: 5 midnights    Based upon physical, mental and social evaluations, I certify that inpatient psychiatric services are medically necessary for this patient for a duration of 5 midnights for the treatment of Severe episode of recurrent major depressive disorder, without psychotic features ([facility] [address] )    Available alternative community resources do not meet the patient's mental health care needs    I further attest that an established written individualized plan of care has been implemented and is outlined in the patient's medical records  ** Please Note: This note has been constructed using a voice recognition system   **

## 2018-01-17 NOTE — ASSESSMENT & PLAN NOTE
· Patient states he has had this for multiple years  · Suspect hemorrhoidal  · Hemoglobin stable  · Check FOBT  · Outpatient follow up with PCP/GI

## 2018-01-17 NOTE — PLAN OF CARE
Problem: Nutrition/Hydration-ADULT  Goal: Nutrient/Hydration intake appropriate for improving, restoring or maintaining nutritional needs  Monitor and assess patient's nutrition/hydration status for malnutrition (ex- brittle hair, bruises, dry skin, pale skin and conjunctiva, muscle wasting, smooth red tongue, and disorientation)  Collaborate with interdisciplinary team and initiate plan and interventions as ordered  Monitor patient's weight and dietary intake as ordered or per policy  Utilize nutrition screening tool and intervene per policy  Determine patient's food preferences and provide high-protein, high-caloric foods as appropriate       INTERVENTIONS:  - Monitor oral intake, urinary output, labs, and treatment plans  - Assess nutrition and hydration status and recommend course of action  - Evaluate amount of meals eaten  - Assist patient with eating if necessary   - Allow adequate time for meals  - Recommend/ encourage appropriate diets, oral nutritional supplements, and vitamin/mineral supplements  - Order, calculate, and assess calorie counts as needed  - Recommend, monitor, and adjust tube feedings and TPN/PPN based on assessed needs  - Assess need for intravenous fluids  - Provide specific nutrition/hydration education as appropriate  - Include patient/family/caregiver in decisions related to nutrition   Outcome: Progressing      Problem: SELF HARM/SUICIDALITY  Goal: Will have no self-injury during hospital stay  INTERVENTIONS:  - Q 15 MINUTES: Routine safety checks  - Q WAKING SHIFT & PRN: Assess risk to determine if routine checks are adequate to maintain patient safety  - Encourage patient to participate actively in care by formulating a plan to combat response to suicidal ideation, identify supports and resources   Outcome: Progressing      Problem: DEPRESSION  Goal: Will be euthymic at discharge  INTERVENTIONS:  - Administer medication as ordered  - Provide emotional support via 1:1 interaction with staff  - Encourage involvement in milieu/groups/activities  - Monitor for social isolation   Outcome: Progressing      Problem: ANXIETY  Goal: Will report anxiety at manageable levels  INTERVENTIONS:  - Administer medication as ordered  - Teach and encourage coping skills  - Provide emotional support  - Assess patient/family for anxiety and ability to cope   Outcome: Progressing    Goal: By discharge: Patient will verbalize 2 strategies to deal with anxiety  Interventions:  - Identify any obvious source/trigger to anxiety  - Staff will assist patient in applying identified coping technique/skills  - Encourage attendance of scheduled groups and activities   Outcome: Progressing      Problem: SUBSTANCE USE/ABUSE  Goal: By discharge, will develop insight into their chemical dependency and sustain motivation to continue in recovery  INTERVENTIONS:  - Attends all daily group sessions and scheduled AA groups  - Actively practices coping skills through participation in the therapeutic community and adherence to program rules  - Reviews and completes assignments from individual treatment plan  - Assist patient development of understanding of their personal cycle of addiction and relapse triggers   Outcome: Progressing    Goal: By discharge, patient will have ongoing treatment plan addressing chemical dependency  INTERVENTIONS:  - Assist patient with resources and/or appointments for ongoing recovery based living   Outcome: Progressing      Problem: DISCHARGE PLANNING  Goal: Discharge to home or other facility with appropriate resources  INTERVENTIONS:  - Identify barriers to discharge w/patient and caregiver  - Arrange for needed discharge resources and transportation as appropriate  - Identify discharge learning needs (meds, wound care, etc )  - Arrange for interpretive services to assist at discharge as needed  - Refer to Case Management Department for coordinating discharge planning if the patient needs post-hospital services based on physician/advanced practitioner order or complex needs related to functional status, cognitive ability, or social support system   Outcome: Progressing

## 2018-01-17 NOTE — PROGRESS NOTES
Pt admitted form ED on 201 with increased depression and +SI with plan to hang self, states he made a noose yesterday at [facility] from a sheet  Pt continues with SI and contracts for safety on unit  He appears depressed and states he has a long psychiatric history starting at the age of 16 yrs old when he first attempted suicide  He has been using drugs since that time as well  He states his Dad dropped him of at [facility], he resides in [place] with his father  He does not have a current psychiatrist or therapist  He states he has "been on all of the antidepressants   they don't work " He is willing to give anything a try  He abuses opiate and benzos he obtains on the internet and does not know what they are, "they have long names " He is hesitant when speaking about his drug history and seems to be confabulating, states he uses heroin "3 bags a week   sometimes   not all the time" by IV  He is Hep C+  He stated he was allergic to Seroquel and Trazodone, "I know they like to use those a lot in these places " He states they cause tachycardia  He later asked what the dose of trazodone was and stated he could take it- it was not given  He refused tylenol r/t concerns for his liver  He took Motrin for back pain  He told me he has bloody stools daily and was encouraged to tell the PA during his H&P, it does not appear that he told the PA  He is requesting Miralax daily for "megacolon" and constipation  He is calm and cooperative  He accepted ativan for anxiety and insomnia

## 2018-01-18 LAB
HETEROPH AB SER QL: NEGATIVE
RPR SER QL: NORMAL

## 2018-01-18 RX ADMIN — METHOCARBAMOL 750 MG: 750 TABLET ORAL at 16:52

## 2018-01-18 RX ADMIN — POLYETHYLENE GLYCOL 3350 17 G: 17 POWDER, FOR SOLUTION ORAL at 08:35

## 2018-01-18 RX ADMIN — CLONIDINE HYDROCHLORIDE 0.3 MG: 0.3 TABLET ORAL at 21:12

## 2018-01-18 RX ADMIN — MELATONIN TAB 3 MG 3 MG: 3 TAB at 21:15

## 2018-01-18 RX ADMIN — CARBAMAZEPINE 200 MG: 200 TABLET ORAL at 18:22

## 2018-01-18 RX ADMIN — CLONIDINE HYDROCHLORIDE 0.15 MG: 0.3 TABLET ORAL at 18:22

## 2018-01-18 RX ADMIN — DULOXETINE HYDROCHLORIDE 20 MG: 20 CAPSULE, DELAYED RELEASE ORAL at 08:35

## 2018-01-18 RX ADMIN — CLONIDINE HYDROCHLORIDE 0.15 MG: 0.3 TABLET ORAL at 08:34

## 2018-01-18 RX ADMIN — CARBAMAZEPINE 200 MG: 200 TABLET ORAL at 08:35

## 2018-01-18 RX ADMIN — GABAPENTIN 800 MG: 400 CAPSULE ORAL at 16:41

## 2018-01-18 RX ADMIN — NICOTINE 1 PATCH: 21 PATCH, EXTENDED RELEASE TRANSDERMAL at 08:35

## 2018-01-18 RX ADMIN — GABAPENTIN 800 MG: 400 CAPSULE ORAL at 08:35

## 2018-01-18 RX ADMIN — GABAPENTIN 800 MG: 400 CAPSULE ORAL at 21:12

## 2018-01-18 NOTE — PROGRESS NOTES
Pt aware he still needs to provide stool sample for occult blood  States he has not had a BM this shift

## 2018-01-18 NOTE — TREATMENT PLAN
TREATMENT PLAN REVIEW - 1 [facility] 22 y o  1995 male MRN: [id number]    [address] [facility] Room / Bed: Hailee Farrar 824-96 Encounter: [id number]          Admit Date/Time:  1/16/2018  4:35 PM    Treatment Team: Attending Provider: Mariama Arias MD; Consulting Physician: Nancy Amezquita DO; Patient Care Technician: [place]; Consulting Physician: Laura Torres MD; Patient Care Technician: Zo Tomas; Patient Care Technician: Michael Gonzlaez; Registered Nurse: Marcy Cain RN; Patient Care Technician: Nicole Silva; Patient Care Technician: Marva Farias;  Advanced Practitioner: Christian Gay PA-C; Patient Care Technician: Joi Crowe    Diagnosis: Principal Problem:    Severe episode of recurrent major depressive disorder, without psychotic features ([facility] )  Active Problems:    Benzodiazepine abuse    Lymphadenopathy    Bloody stools    Hepatitis C    Patient Strengths: cooperative, communication skills, general fund of knowledge, negotiates basic needs, patient is on a voluntary commitment     Patient Barriers: limited education, limited family ties, poor support system, self-care deficit, substance abuse      Short Term Goals: decrease in depressive symptoms, decrease in anxiety symptoms    Long Term Goals: improvement in depression, improvement in anxiety, resolution of depressive symptoms, stabilization of mood, free of suicidal thoughts, improved reality testing, improved reasoning ability, improved impulse control    Progress Towards Goals: starting psychitric medications as prescribed, starting opioid detoxification protocol    Recommended Treatment: medication management, patient medication education, group therapy, milieu therapy, continued Behavioral Health psychiatric evaluation/assessment process     Treatment Frequency: daily medication monitoring, group and milieu therapy daily, monitoring through interdisciplinary rounds, monitoring through weekly patient care conferences    Expected Discharge Date:  4-6 days    Discharge Plan: referral for outpatient medication management with a psychiatrist, referral for outpatient psychotherapy, return to previous living arrangement    Treatment Plan Created/Updated By: Antonia Rojas MD
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
## 2018-01-18 NOTE — PROGRESS NOTES
Patient denies SI and has had no complaints as of this time  Feels that his medications are helping and that he is sleeping well at night  Patient states that he is not going to go to inpatient rehab and that he is interested in intensive outpatient  Patient also reminded that stool specimen still needs to be handed in  Will monitor

## 2018-01-18 NOTE — PROGRESS NOTES
Patient was not seen or examined  Chart was reviewed  Vital signs stable  Discussed with nursing team  No acute events  Awaiting stool sample to rule out bleeding  Mono test negative- recommend outpatient follow up for cervical lymphadenopathy  Recommend follow up with Gi for hepatitis C  Will evaluate tomorrow

## 2018-01-18 NOTE — PROGRESS NOTES
Progress Note - Behavioral Health   Olman Jain 25 y o  male MRN: @MRN   Unit/Bed#: YH3 360-84 Encounter: [id number]        The patient was seen for continuing care and reviewed with staff  Report from staff regarding this patient received and discussed, and records reviewed prior to seeing this patient   Patient condition is improving  He feels less depressed, less anxious more polite and calm without any outburst of anger or agitations  Stated that he feels more himself  Participated in some group activities, communicated with peers and staff  Patient remains anxious and depressed today  Medication side effects: No   ROS: no complaints    Mental Status Evaluation:    Appearance:  dressed appropriately, casually dressed   Behavior:  cooperative   Mood:  improved, depressed, anxious   Affect: appropriate, less constricted    Speech:  decreased rate   Language: appropriate   Thought Process:  normal   Associations: concrete associations   Thought Content:  normal   Perceptual Disturbances: no auditory hallucinations, no visual hallucinations, does not appear responding to internal stimuli   Risk Potential: Suicidal ideation - None at present  Homicidal ideation - None  Potential for aggression - No   Sensorium:  oriented to person, place and time   Memory:  recent and remote memory grossly intact   Consciousness:  alert and awake   Attention: attention span and concentration are normal   Intellect: normal   Fund of Knowledge: awareness of current events appropriate   Insight:  improving   Judgment: improved   Muscle Tone: normal   Gait/Station: normal gait/station and normal balance   Motor Activity: no abnormal movements         Laboratory results:  I have personally reviewed all pertinent laboratory results      Recent Labs      01/17/18   0557   NA  140   K  4 2   CL  104   CO2  30   GLUCOSE  86   CREATININE  0 95   BUN  12     Recent Labs      01/17/18   0557   WBC  4 91   RBC  4 77   HGB  15 7   HCT 44 4   MCV  93   MCH  32 9   RDW  12 7   PLT  210     Recent Labs      01/17/18   0557   CREATININE  0 95   BUN  12   NA  140   K  4 2   CL  104   CO2  30   GLUCOSE  86   PROT  8 2   ALT  650*   AST  212*     Recent Labs      01/17/18   0557   ALKPHOS  91   AST  212*   ALT  650*   BILITOT  0 86   ALBUMIN  4 1     No results for input(s): TROPONINI, CKMB, CKTOTAL in the last 72 hours  Invalid input(s): PBNP  Recent Labs      01/17/18   0557   CHOL  152   HDL  52   TRIG  109     No results for input(s): CRP, SEDRATE, CARLOS, HAV, HEPAIGM, HEPBIGM, HEPBCAB, HEPCAB in the last 72 hours  Invalid input(s): HEAG    CBC:   Recent Labs      01/17/18   0557   WBC  4 91   RBC  4 77   HGB  15 7   HCT  44 4   PLT  210     BMP:   Recent Labs      01/17/18   0557   NA  140   K  4 2   CL  104   CO2  30   BUN  12           Progress Toward Goals: improving gradually  Assessment/Plan   Principal Problem:    Severe episode of recurrent major depressive disorder, without psychotic features (HCC)  Active Problems:    Benzodiazepine abuse    Lymphadenopathy    Bloody stools    Hepatitis C      Recommended Treatment: Because of the patient's 1st signs of improvement, will not change his medications today but will consider increase of Cymbalta to 30 milligrams to more  The patient's anxiety remains relatively high but his depression is improving  He decide any suicidal or homicidal thoughts  His labs were reviewed  Appreciate medical consult to follow up on his several medical conditions the medicine constant are described as stable was the necessity of follow-up outpatient appointments  Planned medication and treatment changes: All current active medications have been reviewed  Continue treatment with group therapy, milieu therapy, occupational therapy and medication management        Risks / Benefits of Treatment:    Risks, benefits, and possible side effects of medications explained to patient and patient verbalizes understanding and agreement for treatment  Counseling / Coordination of Care:    Patient's progress discussed with staff in treatment team meeting  Medication changes reviewed with staff in treatment team meeting  Recent stressors discussed with patient including His relapse on street drugs  Supportive therapy provided to patient  ** Please Note: This note has been constructed using a voice recognition system   **

## 2018-01-19 PROBLEM — K59.00 CONSTIPATION: Status: ACTIVE | Noted: 2018-01-19

## 2018-01-19 RX ORDER — DULOXETIN HYDROCHLORIDE 30 MG/1
30 CAPSULE, DELAYED RELEASE ORAL DAILY
Status: DISCONTINUED | OUTPATIENT
Start: 2018-01-20 | End: 2018-01-22 | Stop reason: HOSPADM

## 2018-01-19 RX ORDER — SENNOSIDES 8.6 MG
2 TABLET ORAL
Status: DISCONTINUED | OUTPATIENT
Start: 2018-01-19 | End: 2018-01-22 | Stop reason: HOSPADM

## 2018-01-19 RX ORDER — SENNOSIDES 8.6 MG
2 TABLET ORAL DAILY PRN
Status: DISCONTINUED | OUTPATIENT
Start: 2018-01-19 | End: 2018-01-19

## 2018-01-19 RX ORDER — DOCUSATE SODIUM 100 MG/1
100 CAPSULE, LIQUID FILLED ORAL 2 TIMES DAILY
Status: DISCONTINUED | OUTPATIENT
Start: 2018-01-19 | End: 2018-01-22 | Stop reason: HOSPADM

## 2018-01-19 RX ADMIN — POLYETHYLENE GLYCOL 3350 17 G: 17 POWDER, FOR SOLUTION ORAL at 08:29

## 2018-01-19 RX ADMIN — CLONIDINE HYDROCHLORIDE 0.15 MG: 0.3 TABLET ORAL at 17:02

## 2018-01-19 RX ADMIN — SENNOSIDES 17.2 MG: 8.6 TABLET, FILM COATED ORAL at 21:50

## 2018-01-19 RX ADMIN — CLONIDINE HYDROCHLORIDE 0.3 MG: 0.3 TABLET ORAL at 21:50

## 2018-01-19 RX ADMIN — CARBAMAZEPINE 200 MG: 200 TABLET ORAL at 17:02

## 2018-01-19 RX ADMIN — GABAPENTIN 800 MG: 400 CAPSULE ORAL at 16:56

## 2018-01-19 RX ADMIN — MELATONIN TAB 3 MG 3 MG: 3 TAB at 21:50

## 2018-01-19 RX ADMIN — CLONIDINE HYDROCHLORIDE 0.15 MG: 0.3 TABLET ORAL at 08:51

## 2018-01-19 RX ADMIN — METHOCARBAMOL 750 MG: 750 TABLET ORAL at 21:50

## 2018-01-19 RX ADMIN — NICOTINE 1 PATCH: 21 PATCH, EXTENDED RELEASE TRANSDERMAL at 08:29

## 2018-01-19 RX ADMIN — GABAPENTIN 800 MG: 400 CAPSULE ORAL at 21:50

## 2018-01-19 RX ADMIN — CARBAMAZEPINE 200 MG: 200 TABLET ORAL at 08:29

## 2018-01-19 RX ADMIN — DOCUSATE SODIUM 100 MG: 100 CAPSULE, LIQUID FILLED ORAL at 17:04

## 2018-01-19 RX ADMIN — DULOXETINE HYDROCHLORIDE 20 MG: 20 CAPSULE, DELAYED RELEASE ORAL at 08:29

## 2018-01-19 RX ADMIN — GABAPENTIN 800 MG: 400 CAPSULE ORAL at 08:30

## 2018-01-19 NOTE — PROGRESS NOTES
Pt pleasant and social on unit  Denies SI and states less depressed  No reports of anxiety or w/d symptoms  Pt requested and given Robaxin for back spasms with good effect  He is compliant with meds and treatment  He is aware that a stool sample is still needed  Will continue to monitor

## 2018-01-19 NOTE — ASSESSMENT & PLAN NOTE
· Reports does not have a bowel movement daily, typical for him is every 5-6 days  · Currently without bowel movements since admission, is passing gas  · No nausea, vomiting  · add Colace, senna, continue MiraLax

## 2018-01-19 NOTE — PROGRESS NOTES
[facility] Internal Medicine  Progress Note - Kevin Stewart 1995, 25 y o  male MRN: [id number]  Unit/Bed#: YA1 657-00 Encounter: [id number]  Primary Care Provider: Lien Azul MD   Date and time admitted to hospital: 1/16/2018  4:35 PM    Constipation   Assessment & Plan    · Reports does not have a bowel movement daily, typical for him is every 5-6 days  · Currently without bowel movements since admission, is passing gas  · No nausea, vomiting  · add Colace, senna, continue MiraLax        Bloody stools   Assessment & Plan    · Patient states he has had this for multiple years  · Suspect hemorrhoidal and related to chronic constipation  · Hemoglobin stable, no BM yet but will check FOBT when able  · Check FOBT- prior were negative according to patient  · Outpatient follow up with PCP/GI        Hepatitis C   Assessment & Plan    · Recommended outpatient GI referral  · Stable  · Has not been treated  · transaminitis noted- check CMP in AM   ·         Lymphadenopathy   Assessment & Plan    · Present for last few months (5-6 months)  · Monotest was negative  · Family doctor was in the process of an outpatient workup - would have family doctor continue workup as outpatient  · No further intervention required as inpatient  No medical contraindications to discharge  Time Spent for Care: 15 minutes  More than 50% of total time spent on counseling and coordination of care as described above  Current Length of Stay: 3 day(s)    Current Patient Status: Inpatient Psych   Code Status: Level 1 - Full Code      Subjective:   No BM since admission, usually only goes every 5-6 days  Has been around 3-5 days since last BM  Passing gas  Blood is not on the stool or mixed with stool- only on toliet paper when he actually does have BM  They have tested his stool before and negative for blood according to patient  Ongoing for 5 years  Patient has 1 right and 2 left cervical lymph nodes for 5 months   No sore throat, weight loss  Objective:     Vitals:   Temp (24hrs), Av 3 °F (36 8 °C), Min:97 7 °F (36 5 °C), Max:98 9 °F (37 2 °C)    HR:  [65-93] 65  Resp:  [15-16] 16  BP: (103-125)/(58-84) 103/62  Body mass index is 20 34 kg/m²  Input and Output Summary (last 24 hours):     No intake or output data in the 24 hours ending 18 1728    Physical Exam:     Physical Exam   Constitutional: No distress  HENT:   Head: Normocephalic and atraumatic  Eyes: Conjunctivae are normal    Neck: No JVD present  Cardiovascular: Normal rate, regular rhythm, normal heart sounds and intact distal pulses  No murmur heard  Pulmonary/Chest: Effort normal and breath sounds normal  No respiratory distress  He has no wheezes  He has no rales  Abdominal: Soft  Bowel sounds are normal  He exhibits no distension  There is no tenderness  There is no rebound and no guarding  Musculoskeletal: He exhibits no edema  Lymphadenopathy:     He has cervical adenopathy (right 1 superior cervical lymphadenopathy, left 2 small nodes <0 5 cm  palpable and nontender)  Skin: Skin is warm and dry  No rash noted  He is not diaphoretic  No erythema  Nursing note and vitals reviewed  Additional Data:     Labs:      Results from last 7 days  Lab Units 18  0557   WBC Thousand/uL 4 91   HEMOGLOBIN g/dL 15 7   HEMATOCRIT % 44 4   PLATELETS Thousands/uL 210   NEUTROS PCT % 53   LYMPHS PCT % 35   MONOS PCT % 10   EOS PCT % 2       Results from last 7 days  Lab Units 18  0557   SODIUM mmol/L 140   POTASSIUM mmol/L 4 2   CHLORIDE mmol/L 104   CO2 mmol/L 30   BUN mg/dL 12   CREATININE mg/dL 0 95   CALCIUM mg/dL 9 7   TOTAL PROTEIN g/dL 8 2   BILIRUBIN TOTAL mg/dL 0 86   ALK PHOS U/L 91   ALT U/L 650*   AST U/L 212*   GLUCOSE RANDOM mg/dL 86           * I Have Reviewed All Lab Data Listed Above  * Additional Pertinent Lab Tests Reviewed:  All Labs Within Last 24 Hours Reviewed    Imaging:    Imaging Reports Reviewed Today Include: none  Imaging Personally Reviewed by Myself Includes:  none    Recent Cultures (last 7 days):           Last 24 Hours Medication List:     carBAMazepine 200 mg Oral BID   cloNIDine 0 15 mg Oral BID   cloNIDine 0 3 mg Oral HS   docusate sodium 100 mg Oral BID   [START ON 1/20/2018] DULoxetine 30 mg Oral Daily   gabapentin 800 mg Oral TID   nicotine 1 patch Transdermal Daily   polyethylene glycol 17 g Oral Daily   senna 2 tablet Oral HS        Today, Patient Was Seen By: Rosa M Reyna PA-C    ** Please Note: Dictation voice to text software may have been used in the creation of this document   **

## 2018-01-19 NOTE — PROGRESS NOTES
Progress Note - Behavioral Health   Tg Dunn 25 y o  male MRN: @MRN   Unit/Bed#: TF8 797-47 Encounter: [id number]        The patient was seen for continuing care and reviewed with staff  Report from staff regarding this patient received and discussed, and records reviewed prior to seeing this patient   More reserved and anxious  Wants to go to intense outpatient therapy  Not in withdrawal  Depression led to opioids and benzo abuse, the patient by some benzodiazepines  from the internet  We discussed patient's interest in fire arms and the fact that his father has a collection of far arms in his his home  The patient stated that the firearms in his home, his father's collection, are locked with specific fire arm locks at which prevents a fire arm to fire  He stated that all firearms as safe and locked  The patient denied any thoughts of killing himself, denied any thoughts of killing anybody else  He stated that even when he was suicidal he never had any plan to shoot himself is fire arms  Depression today ia 3-4 out of 10  Patient remains anxious and depressed today      Medication side effects: No   ROS: no complaints    Mental Status Evaluation:    Appearance:  dressed appropriately, casually dressed   Behavior:  cooperative, calm   Mood:  improved, depressed, anxious   Affect: less constricted, constricted    Speech:  slow   Language: appropriate   Thought Process:  concrete   Associations: concrete associations   Thought Content:  normal   Perceptual Disturbances: no auditory hallucinations, no visual hallucinations   Risk Potential: Suicidal ideation - None at present  Homicidal ideation - None  Potential for aggression - No   Sensorium:  oriented to person, place and time   Memory:  recent and remote memory grossly intact   Consciousness:  alert and awake   Attention: attention span and concentration are normal   Intellect: normal   Fund of Knowledge: awareness of current events appropriate Insight:  improving   Judgment: improving   Muscle Tone: normal   Gait/Station: normal gait/station and normal balance   Motor Activity: no abnormal movements         Laboratory results:  I have personally reviewed all pertinent laboratory results  Recent Labs      01/17/18   0557   NA  140   K  4 2   CL  104   CO2  30   GLUCOSE  86   CREATININE  0 95   BUN  12     Recent Labs      01/17/18   0557   WBC  4 91   RBC  4 77   HGB  15 7   HCT  44 4   MCV  93   MCH  32 9   RDW  12 7   PLT  210     Recent Labs      01/17/18   0557   CREATININE  0 95   BUN  12   NA  140   K  4 2   CL  104   CO2  30   GLUCOSE  86   PROT  8 2   ALT  650*   AST  212*     Recent Labs      01/17/18   0557   ALKPHOS  91   AST  212*   ALT  650*   BILITOT  0 86   ALBUMIN  4 1     No results for input(s): TROPONINI, CKMB, CKTOTAL in the last 72 hours  Invalid input(s): PBNP  Recent Labs      01/17/18   0557   CHOL  152   HDL  52   TRIG  109     No results for input(s): CRP, SEDRATE, CARLOS, HAV, HEPAIGM, HEPBIGM, HEPBCAB, HEPCAB in the last 72 hours  Invalid input(s): HEAG    CBC:   Recent Labs      01/17/18   0557   WBC  4 91   RBC  4 77   HGB  15 7   HCT  44 4   PLT  210     BMP:   Recent Labs      01/17/18   0557   NA  140   K  4 2   CL  104   CO2  30   BUN  12           Progress Toward Goals: improving slowly    Assessment/Plan   Principal Problem:    Severe episode of recurrent major depressive disorder, without psychotic features (HCC)  Active Problems:    Benzodiazepine abuse    Lymphadenopathy    Bloody stools    Hepatitis C      Recommended Treatment:  Will increase Cymbalta to 30 mg to further treat patient's depression  We will continue the rest of his medications  His anxiety was also a addressed by a cognitive therapy  The patient's sleep improved and appetite is improved as well  The he he denied any cravings to any benzodiazepine so opioids    Not in withdrawal   His medical condition remained stable with no complaints  Will check his Tegretol level in appropriate labs tomorrow     Planned medication and treatment changes: All current active medications have been reviewed  Continue treatment with group therapy, milieu therapy, occupational therapy and medication management  Risks / Benefits of Treatment:    Risks, benefits, and possible side effects of medications explained to patient and patient verbalizes understanding and agreement for treatment  Counseling / Coordination of Care:    Patient's progress discussed with staff in treatment team meeting  Medication changes reviewed with staff in treatment team meeting  Supportive therapy provided to patient  Coping skills reviewed with patient  ** Please Note: This note has been constructed using a voice recognition system   **

## 2018-01-19 NOTE — PROGRESS NOTES
Patient about the unit  Quiet and to himself  Denies SI and compliant with medications  Patient reminded that stool specimen still needs to be obtained

## 2018-01-19 NOTE — PLAN OF CARE
Problem: SELF HARM/SUICIDALITY  Goal: Will have no self-injury during hospital stay  INTERVENTIONS:  - Q 15 MINUTES: Routine safety checks  - Q WAKING SHIFT & PRN: Assess risk to determine if routine checks are adequate to maintain patient safety  - Encourage patient to participate actively in care by formulating a plan to combat response to suicidal ideation, identify supports and resources   Outcome: Progressing      Problem: DEPRESSION  Goal: Will be euthymic at discharge  INTERVENTIONS:  - Administer medication as ordered  - Provide emotional support via 1:1 interaction with staff  - Encourage involvement in milieu/groups/activities  - Monitor for social isolation   Outcome: Progressing      Problem: ANXIETY  Goal: Will report anxiety at manageable levels  INTERVENTIONS:  - Administer medication as ordered  - Teach and encourage coping skills  - Provide emotional support  - Assess patient/family for anxiety and ability to cope   Outcome: Progressing    Goal: By discharge: Patient will verbalize 2 strategies to deal with anxiety  Interventions:  - Identify any obvious source/trigger to anxiety  - Staff will assist patient in applying identified coping technique/skills  - Encourage attendance of scheduled groups and activities   Outcome: Progressing      Problem: SUBSTANCE USE/ABUSE  Goal: By discharge, will develop insight into their chemical dependency and sustain motivation to continue in recovery  INTERVENTIONS:  - Attends all daily group sessions and scheduled AA groups  - Actively practices coping skills through participation in the therapeutic community and adherence to program rules  - Reviews and completes assignments from individual treatment plan  - Assist patient development of understanding of their personal cycle of addiction and relapse triggers   Outcome: Progressing    Goal: By discharge, patient will have ongoing treatment plan addressing chemical dependency  INTERVENTIONS:  - Assist patient with resources and/or appointments for ongoing recovery based living   Outcome: Progressing      Problem: DISCHARGE PLANNING  Goal: Discharge to home or other facility with appropriate resources  INTERVENTIONS:  - Identify barriers to discharge w/patient and caregiver  - Arrange for needed discharge resources and transportation as appropriate  - Identify discharge learning needs (meds, wound care, etc )  - Arrange for interpretive services to assist at discharge as needed  - Refer to Case Management Department for coordinating discharge planning if the patient needs post-hospital services based on physician/advanced practitioner order or complex needs related to functional status, cognitive ability, or social support system   Outcome: Progressing      Problem: Ineffective Coping  Goal: Participates in unit activities  Interventions:  - Provide therapeutic environment   - Provide required programming   - Redirect inappropriate behaviors    Outcome: Progressing

## 2018-01-19 NOTE — ASSESSMENT & PLAN NOTE
· Patient states he has had this for multiple years  · Suspect hemorrhoidal and related to chronic constipation  · Hemoglobin stable, no BM yet but will check FOBT when able  · Check FOBT- prior were negative according to patient  · Outpatient follow up with PCP/GI

## 2018-01-19 NOTE — ASSESSMENT & PLAN NOTE
· Present for last few months (5-6 months)  · Monotest was negative  · Family doctor was in the process of an outpatient workup - would have family doctor continue workup as outpatient  · No further intervention required as inpatient

## 2018-01-19 NOTE — CASE MANAGEMENT
Pt has a bright affect, outpt psychiatry, therapy and drug/alcohol counseling appts were made for pt  Pt attends groups, states he wants to be sober now and that he's motivated, pt's father is retired and at home and pt states his father can take him to all of his appointment  Pt states he is going to try to be more interactive and social with his family  Keeping busy and reading on the unit, feels his medications are working well for him and he is less depressed and anxious

## 2018-01-19 NOTE — PLAN OF CARE
Problem: Nutrition/Hydration-ADULT  Goal: Nutrient/Hydration intake appropriate for improving, restoring or maintaining nutritional needs  Monitor and assess patient's nutrition/hydration status for malnutrition (ex- brittle hair, bruises, dry skin, pale skin and conjunctiva, muscle wasting, smooth red tongue, and disorientation)  Collaborate with interdisciplinary team and initiate plan and interventions as ordered  Monitor patient's weight and dietary intake as ordered or per policy  Utilize nutrition screening tool and intervene per policy  Determine patient's food preferences and provide high-protein, high-caloric foods as appropriate       INTERVENTIONS:  - Monitor oral intake, urinary output, labs, and treatment plans  - Assess nutrition and hydration status and recommend course of action  - Evaluate amount of meals eaten  - Assist patient with eating if necessary   - Allow adequate time for meals  - Recommend/ encourage appropriate diets, oral nutritional supplements, and vitamin/mineral supplements  - Order, calculate, and assess calorie counts as needed  - Recommend, monitor, and adjust tube feedings and TPN/PPN based on assessed needs  - Assess need for intravenous fluids  - Provide specific nutrition/hydration education as appropriate  - Include patient/family/caregiver in decisions related to nutrition   Outcome: Progressing      Problem: SELF HARM/SUICIDALITY  Goal: Will have no self-injury during hospital stay  INTERVENTIONS:  - Q 15 MINUTES: Routine safety checks  - Q WAKING SHIFT & PRN: Assess risk to determine if routine checks are adequate to maintain patient safety  - Encourage patient to participate actively in care by formulating a plan to combat response to suicidal ideation, identify supports and resources   Outcome: Progressing      Problem: DEPRESSION  Goal: Will be euthymic at discharge  INTERVENTIONS:  - Administer medication as ordered  - Provide emotional support via 1:1 interaction with staff  - Encourage involvement in milieu/groups/activities  - Monitor for social isolation   Outcome: Progressing      Problem: ANXIETY  Goal: Will report anxiety at manageable levels  INTERVENTIONS:  - Administer medication as ordered  - Teach and encourage coping skills  - Provide emotional support  - Assess patient/family for anxiety and ability to cope   Outcome: Progressing    Goal: By discharge: Patient will verbalize 2 strategies to deal with anxiety  Interventions:  - Identify any obvious source/trigger to anxiety  - Staff will assist patient in applying identified coping technique/skills  - Encourage attendance of scheduled groups and activities   Outcome: Progressing      Problem: SUBSTANCE USE/ABUSE  Goal: By discharge, will develop insight into their chemical dependency and sustain motivation to continue in recovery  INTERVENTIONS:  - Attends all daily group sessions and scheduled AA groups  - Actively practices coping skills through participation in the therapeutic community and adherence to program rules  - Reviews and completes assignments from individual treatment plan  - Assist patient development of understanding of their personal cycle of addiction and relapse triggers   Outcome: Progressing    Goal: By discharge, patient will have ongoing treatment plan addressing chemical dependency  INTERVENTIONS:  - Assist patient with resources and/or appointments for ongoing recovery based living   Outcome: Progressing      Problem: DISCHARGE PLANNING  Goal: Discharge to home or other facility with appropriate resources  INTERVENTIONS:  - Identify barriers to discharge w/patient and caregiver  - Arrange for needed discharge resources and transportation as appropriate  - Identify discharge learning needs (meds, wound care, etc )  - Arrange for interpretive services to assist at discharge as needed  - Refer to Case Management Department for coordinating discharge planning if the patient needs post-hospital services based on physician/advanced practitioner order or complex needs related to functional status, cognitive ability, or social support system   Outcome: Progressing      Problem: Ineffective Coping  Goal: Participates in unit activities  Interventions:  - Provide therapeutic environment   - Provide required programming   - Redirect inappropriate behaviors    Outcome: Progressing

## 2018-01-20 LAB
ALBUMIN SERPL BCP-MCNC: 4.4 G/DL (ref 3.5–5)
ALP SERPL-CCNC: 91 U/L (ref 46–116)
ALT SERPL W P-5'-P-CCNC: 519 U/L (ref 12–78)
ANION GAP SERPL CALCULATED.3IONS-SCNC: 4 MMOL/L (ref 4–13)
AST SERPL W P-5'-P-CCNC: 124 U/L (ref 5–45)
BASOPHILS # BLD AUTO: 0.02 THOUSANDS/ΜL (ref 0–0.1)
BASOPHILS NFR BLD AUTO: 0 % (ref 0–1)
BILIRUB DIRECT SERPL-MCNC: 0.19 MG/DL (ref 0–0.2)
BILIRUB SERPL-MCNC: 0.57 MG/DL (ref 0.2–1)
BUN SERPL-MCNC: 15 MG/DL (ref 5–25)
CALCIUM SERPL-MCNC: 9.8 MG/DL (ref 8.3–10.1)
CARBAMAZEPINE SERPL-MCNC: 7.4 UG/ML (ref 4–12)
CHLORIDE SERPL-SCNC: 106 MMOL/L (ref 100–108)
CO2 SERPL-SCNC: 31 MMOL/L (ref 21–32)
CREAT SERPL-MCNC: 0.9 MG/DL (ref 0.6–1.3)
EOSINOPHIL # BLD AUTO: 0.16 THOUSAND/ΜL (ref 0–0.61)
EOSINOPHIL NFR BLD AUTO: 3 % (ref 0–6)
ERYTHROCYTE [DISTWIDTH] IN BLOOD BY AUTOMATED COUNT: 12.7 % (ref 11.6–15.1)
GFR SERPL CREATININE-BSD FRML MDRD: 122 ML/MIN/1.73SQ M
GLUCOSE P FAST SERPL-MCNC: 76 MG/DL (ref 65–99)
GLUCOSE SERPL-MCNC: 76 MG/DL (ref 65–140)
HCT VFR BLD AUTO: 45.8 % (ref 36.5–49.3)
HGB BLD-MCNC: 16.1 G/DL (ref 12–17)
LYMPHOCYTES # BLD AUTO: 1.61 THOUSANDS/ΜL (ref 0.6–4.47)
LYMPHOCYTES NFR BLD AUTO: 32 % (ref 14–44)
MCH RBC QN AUTO: 32.9 PG (ref 26.8–34.3)
MCHC RBC AUTO-ENTMCNC: 35.2 G/DL (ref 31.4–37.4)
MCV RBC AUTO: 94 FL (ref 82–98)
MONOCYTES # BLD AUTO: 0.6 THOUSAND/ΜL (ref 0.17–1.22)
MONOCYTES NFR BLD AUTO: 12 % (ref 4–12)
NEUTROPHILS # BLD AUTO: 2.63 THOUSANDS/ΜL (ref 1.85–7.62)
NEUTS SEG NFR BLD AUTO: 53 % (ref 43–75)
NRBC BLD AUTO-RTO: 0 /100 WBCS
PLATELET # BLD AUTO: 221 THOUSANDS/UL (ref 149–390)
PMV BLD AUTO: 9.2 FL (ref 8.9–12.7)
POTASSIUM SERPL-SCNC: 3.8 MMOL/L (ref 3.5–5.3)
PROT SERPL-MCNC: 8.9 G/DL (ref 6.4–8.2)
RBC # BLD AUTO: 4.89 MILLION/UL (ref 3.88–5.62)
SODIUM SERPL-SCNC: 141 MMOL/L (ref 136–145)
WBC # BLD AUTO: 5.02 THOUSAND/UL (ref 4.31–10.16)

## 2018-01-20 PROCEDURE — 80156 ASSAY CARBAMAZEPINE TOTAL: CPT | Performed by: PSYCHIATRY & NEUROLOGY

## 2018-01-20 PROCEDURE — 82306 VITAMIN D 25 HYDROXY: CPT | Performed by: PSYCHIATRY & NEUROLOGY

## 2018-01-20 PROCEDURE — 80048 BASIC METABOLIC PNL TOTAL CA: CPT | Performed by: PSYCHIATRY & NEUROLOGY

## 2018-01-20 PROCEDURE — 85025 COMPLETE CBC W/AUTO DIFF WBC: CPT | Performed by: PSYCHIATRY & NEUROLOGY

## 2018-01-20 PROCEDURE — 80076 HEPATIC FUNCTION PANEL: CPT | Performed by: PHYSICIAN ASSISTANT

## 2018-01-20 RX ORDER — METHOCARBAMOL 500 MG/1
1000 TABLET, FILM COATED ORAL EVERY 6 HOURS PRN
Status: DISCONTINUED | OUTPATIENT
Start: 2018-01-21 | End: 2018-01-22 | Stop reason: HOSPADM

## 2018-01-20 RX ORDER — POLYETHYLENE GLYCOL 3350 17 G/17G
17 POWDER, FOR SOLUTION ORAL 2 TIMES DAILY
Status: DISCONTINUED | OUTPATIENT
Start: 2018-01-20 | End: 2018-01-22 | Stop reason: HOSPADM

## 2018-01-20 RX ADMIN — DOCUSATE SODIUM 100 MG: 100 CAPSULE, LIQUID FILLED ORAL at 09:00

## 2018-01-20 RX ADMIN — METHOCARBAMOL 750 MG: 750 TABLET ORAL at 17:26

## 2018-01-20 RX ADMIN — POLYETHYLENE GLYCOL 3350 17 G: 17 POWDER, FOR SOLUTION ORAL at 17:49

## 2018-01-20 RX ADMIN — CLONIDINE HYDROCHLORIDE 0.15 MG: 0.3 TABLET ORAL at 08:59

## 2018-01-20 RX ADMIN — POLYETHYLENE GLYCOL 3350 17 G: 17 POWDER, FOR SOLUTION ORAL at 10:13

## 2018-01-20 RX ADMIN — DOCUSATE SODIUM 100 MG: 100 CAPSULE, LIQUID FILLED ORAL at 17:22

## 2018-01-20 RX ADMIN — GABAPENTIN 800 MG: 400 CAPSULE ORAL at 16:47

## 2018-01-20 RX ADMIN — GABAPENTIN 800 MG: 400 CAPSULE ORAL at 21:45

## 2018-01-20 RX ADMIN — CARBAMAZEPINE 200 MG: 200 TABLET ORAL at 08:59

## 2018-01-20 RX ADMIN — CLONIDINE HYDROCHLORIDE 0.3 MG: 0.3 TABLET ORAL at 22:31

## 2018-01-20 RX ADMIN — CARBAMAZEPINE 200 MG: 200 TABLET ORAL at 17:22

## 2018-01-20 RX ADMIN — METHOCARBAMOL 750 MG: 750 TABLET ORAL at 08:58

## 2018-01-20 RX ADMIN — CLONIDINE HYDROCHLORIDE 0.15 MG: 0.3 TABLET ORAL at 17:22

## 2018-01-20 RX ADMIN — SENNOSIDES 17.2 MG: 8.6 TABLET, FILM COATED ORAL at 21:45

## 2018-01-20 RX ADMIN — GABAPENTIN 800 MG: 400 CAPSULE ORAL at 08:59

## 2018-01-20 RX ADMIN — DULOXETINE HYDROCHLORIDE 30 MG: 30 CAPSULE, DELAYED RELEASE ORAL at 08:58

## 2018-01-20 RX ADMIN — NICOTINE 1 PATCH: 21 PATCH, EXTENDED RELEASE TRANSDERMAL at 09:03

## 2018-01-20 NOTE — PROGRESS NOTES
Patient out in milieu, watching tv and reading a book  He denies SI and is med compliant  He is pleasant and cooperative

## 2018-01-20 NOTE — PROGRESS NOTES
Progress Note - Behavioral Health   [place] 25 y o  male MRN: [id number]  Unit/Bed#: UH8 495-39 Encounter: [id number]    Patient is a 25 year all male with a history of depression, anxiety, self abusive behaviors substance use and personality disorder who was admitted to the psychiatric unit on a 201 voluntary commitment due to depression anxiety, suicidal ideation to plan to hang himself  Stressors include drug use problems  Prior to this hospitalization he had been to a drug and alcohol residential program, he did not like the program   History of benzodiazepine and opiate use      Behavior over the last 24 hours:  improved  Sleep: normal  Appetite: normal  Medication side effects: No  ROS: constipation    Medications:   Current Facility-Administered Medications   Medication Dose Route Frequency Provider Last Rate Last Dose    aluminum-magnesium hydroxide-simethicone (MYLANTA) 200-200-20 mg/5 mL oral suspension 30 mL  30 mL Oral Q4H PRN Shankar Machado MD        carBAMazepine (TEGretol) tablet 200 mg  200 mg Oral BID Valeria Duvall MD   200 mg at 01/19/18 1702    cloNIDine (CATAPRES) tablet 0 15 mg  0 15 mg Oral BID Valeria Duvall MD   0 15 mg at 01/19/18 1702    cloNIDine (CATAPRES) tablet 0 3 mg  0 3 mg Oral HS Valeria Duvall MD   0 3 mg at 01/19/18 2150    diphenhydrAMINE (BENADRYL) tablet 25 mg  25 mg Oral HS PRN Valeria Duvall MD        docusate sodium (COLACE) capsule 100 mg  100 mg Oral BID Christina Suazo PA-C   100 mg at 01/19/18 1704    [START ON 1/20/2018] DULoxetine (CYMBALTA) delayed release capsule 30 mg  30 mg Oral Daily Valeria Duvall MD        gabapentin (NEURONTIN) capsule 800 mg  800 mg Oral TID Valeria Duvall MD   800 mg at 01/19/18 2150    ibuprofen (MOTRIN) tablet 400 mg  400 mg Oral Q8H PRN Valeria Duvall MD        ibuprofen (MOTRIN) tablet 600 mg  600 mg Oral Q8H PRN Shankar Machado MD   600 mg at 01/17/18 2143    ibuprofen (MOTRIN) tablet 800 mg  800 mg Oral Q8H PRN Isac Brambila MD        LORazepam (ATIVAN) tablet 1 mg  1 mg Oral Q4H PRN Kalyani Wang MD        magnesium hydroxide (MILK OF MAGNESIA) 400 mg/5 mL oral suspension 30 mL  30 mL Oral Daily PRN Isac Brambila MD        melatonin tablet 3 mg  3 mg Oral HS PRN Kalyani Wang MD   3 mg at 01/19/18 2150    methocarbamol (ROBAXIN) tablet 750 mg  750 mg Oral Q4H PRN Kalyani Wang MD   750 mg at 01/19/18 2150    nicotine (NICODERM CQ) 21 mg/24 hr TD 24 hr patch 1 patch  1 patch Transdermal Daily Isac Brambila MD   1 patch at 01/19/18 0829    nicotine polacrilex (NICORETTE) gum 2 mg  2 mg Oral Q2H PRN Kalyani Wang MD        OLANZapine (ZyPREXA ZYDIS) dispersible tablet 10 mg  10 mg Oral Q3H PRN Kalyani Wang MD        OLANZapine (ZyPREXA) IM injection 10 mg  10 mg Intramuscular Q3H PRN Kalyani Wang MD        ondansetron (ZOFRAN-ODT) dispersible tablet 4 mg  4 mg Oral Q6H PRN Kalyani Wang MD        polyethylene glycol (MIRALAX) packet 17 g  17 g Oral Daily Kalyani Wang MD   17 g at 01/19/18 0829    senna (SENOKOT) tablet 17 2 mg  2 tablet Oral HS Christina Suazo PA-C   17 2 mg at 01/19/18 2150     Prescriptions Prior to Admission   Medication    buprenorphine-naloxone (SUBOXONE) 8-2 mg per SL tablet    cloNIDine (CATAPRES) 0 3 mg tablet    gabapentin (NEURONTIN) 800 mg tablet    methocarbamol (ROBAXIN) 750 mg tablet    polyethylene glycol (MIRALAX) 17 g packet       Labs:   Admission on 01/16/2018   Component Date Value    Amph/Meth UR 01/16/2018 Negative     Barbiturate Ur 01/16/2018 Negative     Benzodiazepine Urine 01/16/2018 Negative     Cocaine Urine 01/16/2018 Negative     Methadone Urine 01/16/2018 Negative     Opiate Urine 01/16/2018 Negative     PCP Ur 01/16/2018 Negative     THC Urine 01/16/2018 Negative     EXTBreath Alcohol 01/16/2018 0 000     Color, UA 01/17/2018 Yellow     Clarity, UA 01/17/2018 Clear     Specific Gravity, UA 01/17/2018 1 021     pH, UA 01/17/2018 6 0     Leukocytes, UA 01/17/2018 Negative     Nitrite, UA 01/17/2018 Negative     Protein, UA 01/17/2018 Negative     Glucose, UA 01/17/2018 Negative     Ketones, UA 01/17/2018 Negative     Urobilinogen, UA 01/17/2018 1 0     Bilirubin, UA 01/17/2018 Interference- unable to analyze*    Blood, UA 01/17/2018 Negative     Cholesterol 01/17/2018 152     Triglycerides 01/17/2018 109     HDL, Direct 01/17/2018 52     LDL Calculated 01/17/2018 78     Sodium 01/17/2018 140     Potassium 01/17/2018 4 2     Chloride 01/17/2018 104     CO2 01/17/2018 30     Anion Gap 01/17/2018 6     BUN 01/17/2018 12     Creatinine 01/17/2018 0 95     Glucose 01/17/2018 86     Glucose, Fasting 01/17/2018 86     Calcium 01/17/2018 9 7     AST 01/17/2018 212*    ALT 01/17/2018 650*    Alkaline Phosphatase 01/17/2018 91     Total Protein 01/17/2018 8 2     Albumin 01/17/2018 4 1     Total Bilirubin 01/17/2018 0 86     eGFR 01/17/2018 114     WBC 01/17/2018 4 91     RBC 01/17/2018 4 77     Hemoglobin 01/17/2018 15 7     Hematocrit 01/17/2018 44 4     MCV 01/17/2018 93     MCH 01/17/2018 32 9     MCHC 01/17/2018 35 4     RDW 01/17/2018 12 7     MPV 01/17/2018 9 2     Platelets 46/39/3175 210     nRBC 01/17/2018 0     Neutrophils Relative 01/17/2018 53     Lymphocytes Relative 01/17/2018 35     Monocytes Relative 01/17/2018 10     Eosinophils Relative 01/17/2018 2     Basophils Relative 01/17/2018 0     Neutrophils Absolute 01/17/2018 2 54     Lymphocytes Absolute 01/17/2018 1 73     Monocytes Absolute 01/17/2018 0 49     Eosinophils Absolute 01/17/2018 0 12     Basophils Absolute 01/17/2018 0 02     RPR 01/17/2018 Non-Reactive     TSH 3RD GENERATON 01/17/2018 1 300     Monotest 01/17/2018 Negative        Mental Status Evaluation:  Appearance:  age appropriate and casually dressed   Behavior:  normal   Speech:  normal pitch and normal volume   Mood:  less depressed and less anxious   Affect:  mood-congruent   Thought Process:  goal directed   Thought Content:  No overt delusions   Perceptual Disturbances: None   Risk Potential: Suicidal Ideations none   Sensorium:  person, place and time/date   Cognition:  grossly intact   Consciousness:  alert    Attention: attention span and concentration were age appropriate   Insight:  Improving   Judgment: Improving   Gait/Station: Normal   Motor Activity: no abnormal movements     Progress Toward Goals:  Improved    Assessment/Plan   Principal Problem:    Severe episode of recurrent major depressive disorder, without psychotic features (HCC)  Active Problems:    Benzodiazepine abuse    Lymphadenopathy    Bloody stools    Hepatitis C    Constipation   Medications:  Tegretol 200 mg in the morning  Clonidine 0 15 mg twice a day  Clonidine 0 3 at bedtime  Cymbalta 30 mg daily  Gabapentin 100 mg 3 times a day    Recommended Treatment: Continue with group therapy, milieu therapy and occupational therapy  Risks, benefits and possible side effects of Medications:   Risks, benefits, and possible side effects of medications explained to patient and patient verbalizes understanding  Counseling / Coordination of Care  Total floor / unit time spent today 30 minutes  Greater than 50% of total time was spent with the patient and / or family counseling and / or coordination of care

## 2018-01-20 NOTE — PLAN OF CARE
ANXIETY     Will report anxiety at manageable levels Progressing     By discharge: Patient will verbalize 2 strategies to deal with anxiety Progressing        DEPRESSION     Will be euthymic at discharge Progressing        Nutrition/Hydration-ADULT     Nutrient/Hydration intake appropriate for improving, restoring or maintaining nutritional needs Progressing        SELF HARM/SUICIDALITY     Will have no self-injury during hospital stay Progressing        SUBSTANCE USE/ABUSE     By discharge, will develop insight into their chemical dependency and sustain motivation to continue in recovery Progressing     By discharge, patient will have ongoing treatment plan addressing chemical dependency Progressing

## 2018-01-20 NOTE — PROGRESS NOTES
Discussed with the patient briefly, he has still not had a bowel movement (6 days according to patient) however reports that this is not unusual for him  He normally takes MiraLax twice a day at home  We will increase MiraLax to b i d   Continue Colace, senna  If no BM by tomorrow, will discuss suppository or alternative options  Eating and drinking well, passing gas, no nausea/vomiting/abdominal pain

## 2018-01-20 NOTE — PROGRESS NOTES
Patient calm and cooperative this morning  Denied symptoms and needs, there than requesting Robaxin for back pain, which was not effective for him

## 2018-01-21 RX ORDER — LACTULOSE 20 G/30ML
10 SOLUTION ORAL ONCE
Status: COMPLETED | OUTPATIENT
Start: 2018-01-21 | End: 2018-01-21

## 2018-01-21 RX ADMIN — LACTULOSE 10 G: 20 SOLUTION ORAL at 13:39

## 2018-01-21 RX ADMIN — CLONIDINE HYDROCHLORIDE 0.15 MG: 0.3 TABLET ORAL at 08:22

## 2018-01-21 RX ADMIN — DULOXETINE HYDROCHLORIDE 30 MG: 30 CAPSULE, DELAYED RELEASE ORAL at 08:22

## 2018-01-21 RX ADMIN — CLONIDINE HYDROCHLORIDE 0.15 MG: 0.3 TABLET ORAL at 18:02

## 2018-01-21 RX ADMIN — SENNOSIDES 17.2 MG: 8.6 TABLET, FILM COATED ORAL at 21:38

## 2018-01-21 RX ADMIN — METHOCARBAMOL 1000 MG: 500 TABLET ORAL at 18:07

## 2018-01-21 RX ADMIN — GABAPENTIN 800 MG: 400 CAPSULE ORAL at 16:35

## 2018-01-21 RX ADMIN — DOCUSATE SODIUM 100 MG: 100 CAPSULE, LIQUID FILLED ORAL at 18:02

## 2018-01-21 RX ADMIN — GABAPENTIN 800 MG: 400 CAPSULE ORAL at 08:22

## 2018-01-21 RX ADMIN — METHOCARBAMOL 1000 MG: 500 TABLET ORAL at 08:46

## 2018-01-21 RX ADMIN — CARBAMAZEPINE 200 MG: 200 TABLET ORAL at 08:23

## 2018-01-21 RX ADMIN — CARBAMAZEPINE 200 MG: 200 TABLET ORAL at 18:02

## 2018-01-21 RX ADMIN — CLONIDINE HYDROCHLORIDE 0.3 MG: 0.3 TABLET ORAL at 21:38

## 2018-01-21 RX ADMIN — DOCUSATE SODIUM 100 MG: 100 CAPSULE, LIQUID FILLED ORAL at 08:23

## 2018-01-21 RX ADMIN — NICOTINE 1 PATCH: 21 PATCH, EXTENDED RELEASE TRANSDERMAL at 08:23

## 2018-01-21 RX ADMIN — GABAPENTIN 800 MG: 400 CAPSULE ORAL at 21:38

## 2018-01-21 RX ADMIN — POLYETHYLENE GLYCOL 3350 17 G: 17 POWDER, FOR SOLUTION ORAL at 18:01

## 2018-01-21 NOTE — ASSESSMENT & PLAN NOTE
· Patient states he has had this for multiple years  · Suspect hemorrhoidal and related to chronic constipation  · Hemoglobin stable, no BM yet but will check FOBT when able  · Prior FOBT were negative according to patient  · Outpatient follow up with PCP/GI

## 2018-01-21 NOTE — PROGRESS NOTES
[address] Internal Medicine  Progress Note - [place] Anne 1995, 25 y o  male MRN: [id number]  Unit/Bed#: TN2 678-25 Encounter: [id number]  Primary Care Provider: Esteban Winkler MD   Date and time admitted to hospital: 1/16/2018  4:35 PM    Constipation   Assessment & Plan    · Reports does not have a bowel movement daily, typical for him is every 5-6 days  Currently has been 7 days since last BM  · Currently without bowel movements since admission, is passing gas  · No nausea, vomiting  · On Colace, senna, continue MiraLax BID but still no BM  · BS hypoactive but present, no distention and no n/v   · Will trial lactulose x 1  Bloody stools   Assessment & Plan    · Patient states he has had this for multiple years  · Suspect hemorrhoidal and related to chronic constipation  · Hemoglobin stable, no BM yet but will check FOBT when able  · Prior FOBT were negative according to patient  · Outpatient follow up with PCP/GI        Hepatitis C   Assessment & Plan    · Recommended outpatient GI referral  · Stable  · Has not been treated  · transaminitis noted  Lymphadenopathy   Assessment & Plan    · Present for last few months (5-6 months)  · Monotest was negative  · Family doctor was in the process of an outpatient workup - would have family doctor continue workup as outpatient  · No further intervention required as inpatient  Discussions with Specialists or Other Care Team Provider: psychiatry    Education and Discussions with Family / Patient: patient    Time Spent for Care: 15 minutes  More than 50% of total time spent on counseling and coordination of care as described above  Current Length of Stay: 5 day(s)    Current Patient Status: Inpatient Psych   Discharge Plan: no medical contraindications to discharge    Code Status: Level 1 - Full Code      Subjective:   Mr John Anne reports no BM in 7 days  No abdominal pain  No chest pain or SOB  No nausea or vomiting   Takes miralax BID at home and usual BM every 5 days  Objective:     Vitals:   Temp (24hrs), Av 4 °F (36 9 °C), Min:98 3 °F (36 8 °C), Max:98 5 °F (36 9 °C)    HR:  [64-85] 69  Resp:  [16] 16  BP: (107-128)/(56-84) 116/58  Body mass index is 20 34 kg/m²  Input and Output Summary (last 24 hours):     No intake or output data in the 24 hours ending 18 1333    Physical Exam:     Physical Exam   Constitutional: No distress  HENT:   Head: Normocephalic and atraumatic  Eyes: Conjunctivae are normal    Cardiovascular: Normal rate, regular rhythm, normal heart sounds and intact distal pulses  No murmur heard  Pulmonary/Chest: Effort normal and breath sounds normal    Abdominal: Soft  He exhibits no distension  There is no tenderness  There is no rebound and no guarding  Hypoactive BS, soft abdomen  No distention   Neurological: He is alert  Skin: Skin is warm and dry  No rash noted  He is not diaphoretic  No erythema  Psychiatric: His behavior is normal    Nursing note and vitals reviewed  Additional Data:     Labs:      Results from last 7 days  Lab Units 18  0749   WBC Thousand/uL 5 02   HEMOGLOBIN g/dL 16 1   HEMATOCRIT % 45 8   PLATELETS Thousands/uL 221   NEUTROS PCT % 53   LYMPHS PCT % 32   MONOS PCT % 12   EOS PCT % 3       Results from last 7 days  Lab Units 18  0749   SODIUM mmol/L 141   POTASSIUM mmol/L 3 8   CHLORIDE mmol/L 106   CO2 mmol/L 31   BUN mg/dL 15   CREATININE mg/dL 0 90   CALCIUM mg/dL 9 8   TOTAL PROTEIN g/dL 8 9*   BILIRUBIN TOTAL mg/dL 0 57   ALK PHOS U/L 91   ALT U/L 519*   AST U/L 124*   GLUCOSE RANDOM mg/dL 76           * I Have Reviewed All Lab Data Listed Above  * Additional Pertinent Lab Tests Reviewed:  All Labs Within Last 24 Hours Reviewed    Imaging:    Imaging Reports Reviewed Today Include: none  Imaging Personally Reviewed by Myself Includes:  none    Recent Cultures (last 7 days):           Last 24 Hours Medication List:     carBAMazepine 200 mg Oral BID cloNIDine 0 15 mg Oral BID   cloNIDine 0 3 mg Oral HS   docusate sodium 100 mg Oral BID   DULoxetine 30 mg Oral Daily   gabapentin 800 mg Oral TID   lactulose 10 g Oral Once   nicotine 1 patch Transdermal Daily   polyethylene glycol 17 g Oral BID   senna 2 tablet Oral HS        Today, Patient Was Seen By: Bhanu Jones PA-C    ** Please Note: Dictation voice to text software may have been used in the creation of this document   **

## 2018-01-21 NOTE — ASSESSMENT & PLAN NOTE
· Reports does not have a bowel movement daily, typical for him is every 5-6 days  Currently has been 7 days since last BM  · Currently without bowel movements since admission, is passing gas  · No nausea, vomiting  · On Colace, senna, continue MiraLax BID but still no BM  · BS hypoactive but present, no distention and no n/v   · Will trial lactulose x 1

## 2018-01-21 NOTE — PLAN OF CARE
Problem: Nutrition/Hydration-ADULT  Goal: Nutrient/Hydration intake appropriate for improving, restoring or maintaining nutritional needs  Monitor and assess patient's nutrition/hydration status for malnutrition (ex- brittle hair, bruises, dry skin, pale skin and conjunctiva, muscle wasting, smooth red tongue, and disorientation)  Collaborate with interdisciplinary team and initiate plan and interventions as ordered  Monitor patient's weight and dietary intake as ordered or per policy  Utilize nutrition screening tool and intervene per policy  Determine patient's food preferences and provide high-protein, high-caloric foods as appropriate       INTERVENTIONS:  - Monitor oral intake, urinary output, labs, and treatment plans  - Assess nutrition and hydration status and recommend course of action  - Evaluate amount of meals eaten  - Assist patient with eating if necessary   - Allow adequate time for meals  - Recommend/ encourage appropriate diets, oral nutritional supplements, and vitamin/mineral supplements  - Order, calculate, and assess calorie counts as needed  - Recommend, monitor, and adjust tube feedings and TPN/PPN based on assessed needs  - Assess need for intravenous fluids  - Provide specific nutrition/hydration education as appropriate  - Include patient/family/caregiver in decisions related to nutrition   Outcome: Completed Date Met: 01/21/18      Problem: SELF HARM/SUICIDALITY  Goal: Will have no self-injury during hospital stay  INTERVENTIONS:  - Q 15 MINUTES: Routine safety checks  - Q WAKING SHIFT & PRN: Assess risk to determine if routine checks are adequate to maintain patient safety  - Encourage patient to participate actively in care by formulating a plan to combat response to suicidal ideation, identify supports and resources   Outcome: Adequate for Discharge      Problem: DEPRESSION  Goal: Will be euthymic at discharge  INTERVENTIONS:  - Administer medication as ordered  - Provide emotional support via 1:1 interaction with staff  - Encourage involvement in milieu/groups/activities  - Monitor for social isolation   Outcome: Progressing      Problem: ANXIETY  Goal: Will report anxiety at manageable levels  INTERVENTIONS:  - Administer medication as ordered  - Teach and encourage coping skills  - Provide emotional support  - Assess patient/family for anxiety and ability to cope   Outcome: Progressing    Goal: By discharge: Patient will verbalize 2 strategies to deal with anxiety  Interventions:  - Identify any obvious source/trigger to anxiety  - Staff will assist patient in applying identified coping technique/skills  - Encourage attendance of scheduled groups and activities   Outcome: Progressing      Problem: SUBSTANCE USE/ABUSE  Goal: By discharge, will develop insight into their chemical dependency and sustain motivation to continue in recovery  INTERVENTIONS:  - Attends all daily group sessions and scheduled AA groups  - Actively practices coping skills through participation in the therapeutic community and adherence to program rules  - Reviews and completes assignments from individual treatment plan  - Assist patient development of understanding of their personal cycle of addiction and relapse triggers   Outcome: Progressing    Goal: By discharge, patient will have ongoing treatment plan addressing chemical dependency  INTERVENTIONS:  - Assist patient with resources and/or appointments for ongoing recovery based living   Outcome: Progressing      Problem: DISCHARGE PLANNING  Goal: Discharge to home or other facility with appropriate resources  INTERVENTIONS:  - Identify barriers to discharge w/patient and caregiver  - Arrange for needed discharge resources and transportation as appropriate  - Identify discharge learning needs (meds, wound care, etc )  - Arrange for interpretive services to assist at discharge as needed  - Refer to Case Management Department for coordinating discharge planning if the patient needs post-hospital services based on physician/advanced practitioner order or complex needs related to functional status, cognitive ability, or social support system   Outcome: Progressing      Problem: Ineffective Coping  Goal: Participates in unit activities  Interventions:  - Provide therapeutic environment   - Provide required programming   - Redirect inappropriate behaviors    Outcome: Progressing

## 2018-01-21 NOTE — ASSESSMENT & PLAN NOTE
· Present for last few months (5-6 months)  · Monotest was negative  · Family doctor was in the process of an outpatient workup - would have family doctor continue workup as outpatient  · No further intervention required as inpatient  No

## 2018-01-21 NOTE — PROGRESS NOTES
Progress Note - Behavioral Health   Andrew Neville 25 y o  male MRN: [id number]  Unit/Bed#: YH8 553-85 Encounter: [id number]    Patient is a 25 year all male with a history of depression, anxiety, self abusive behaviors substance use and personality disorder who was admitted to the psychiatric unit on a 201 voluntary commitment due to depression anxiety, suicidal ideation to plan to hang himself  Stressors include drug use problems  Prior to this hospitalization he had been to a drug and alcohol residential program, he did not like the program   History of benzodiazepine and opiate use     1-  PT spoke at length about problems that he has had most of his life  We talked about how to manage his anxiety, he was receptive to things that he needs to do in order to improve and  Find what he would like to do in his life  Behavior over the last 24 hours:  improved  Sleep: normal  Appetite: normal  Medication side effects: No  ROS: constipation, chronic      Medications:   Current Facility-Administered Medications   Medication Dose Route Frequency Provider Last Rate Last Dose    aluminum-magnesium hydroxide-simethicone (MYLANTA) 200-200-20 mg/5 mL oral suspension 30 mL  30 mL Oral Q4H PRN Isac Brambila MD        carBAMazepine (TEGretol) tablet 200 mg  200 mg Oral BID Kalyani Wang MD   200 mg at 01/21/18 5551    cloNIDine (CATAPRES) tablet 0 15 mg  0 15 mg Oral BID Kalyani Wang MD   0 15 mg at 01/21/18 7712    cloNIDine (CATAPRES) tablet 0 3 mg  0 3 mg Oral HS Kalyani Wang MD   0 3 mg at 01/20/18 2231    diphenhydrAMINE (BENADRYL) tablet 25 mg  25 mg Oral HS PRN Kalyani Wang MD        docusate sodium (COLACE) capsule 100 mg  100 mg Oral BID Christina Suazo PA-C   100 mg at 01/21/18 4610    DULoxetine (CYMBALTA) delayed release capsule 30 mg  30 mg Oral Daily Kalyani Wang MD   30 mg at 01/21/18 2316    gabapentin (NEURONTIN) capsule 800 mg  800 mg Oral TID Kalyani Wang MD 800 mg at 01/21/18 2390    ibuprofen (MOTRIN) tablet 400 mg  400 mg Oral Q8H PRN Kulwinder Barnes MD        ibuprofen (MOTRIN) tablet 600 mg  600 mg Oral Q8H PRN Byron Alford MD   600 mg at 01/17/18 2143    ibuprofen (MOTRIN) tablet 800 mg  800 mg Oral Q8H PRN Byron Alford MD        LORazepam (ATIVAN) tablet 1 mg  1 mg Oral Q4H PRN Kulwinder Barnes MD        magnesium hydroxide (MILK OF MAGNESIA) 400 mg/5 mL oral suspension 30 mL  30 mL Oral Daily PRN Byron Alford MD        melatonin tablet 3 mg  3 mg Oral HS PRN Kulwinder Barnes MD   3 mg at 01/19/18 2150    methocarbamol (ROBAXIN) tablet 1,000 mg  1,000 mg Oral Q6H PRN Aysha Ramirez MD   1,000 mg at 01/21/18 0846    nicotine (NICODERM CQ) 21 mg/24 hr TD 24 hr patch 1 patch  1 patch Transdermal Daily Byron Alford MD   1 patch at 01/21/18 3873    nicotine polacrilex (NICORETTE) gum 2 mg  2 mg Oral Q2H PRN Kulwinder Barnes MD        OLANZapine (ZyPREXA ZYDIS) dispersible tablet 10 mg  10 mg Oral Q3H PRN Kulwinder Barnes MD        OLANZapine (ZyPREXA) IM injection 10 mg  10 mg Intramuscular Q3H PRN Kulwinder Barnes MD        ondansetron (ZOFRAN-ODT) dispersible tablet 4 mg  4 mg Oral Q6H PRN Kulwinder Barnes MD        polyethylene glycol (MIRALAX) packet 17 g  17 g Oral BID Christina Suazo PA-C   17 g at 01/20/18 1749    senna (SENOKOT) tablet 17 2 mg  2 tablet Oral HS Christina Suazo PA-C   17 2 mg at 01/20/18 2145     Prescriptions Prior to Admission   Medication    buprenorphine-naloxone (SUBOXONE) 8-2 mg per SL tablet    cloNIDine (CATAPRES) 0 3 mg tablet    gabapentin (NEURONTIN) 800 mg tablet    methocarbamol (ROBAXIN) 750 mg tablet    polyethylene glycol (MIRALAX) 17 g packet       Labs:   Admission on 01/16/2018   Component Date Value    Amph/Meth UR 01/16/2018 Negative     Barbiturate Ur 01/16/2018 Negative     Benzodiazepine Urine 01/16/2018 Negative     Cocaine Urine 01/16/2018 Negative     Methadone Urine 01/16/2018 Negative  Opiate Urine 01/16/2018 Negative     PCP Ur 01/16/2018 Negative     THC Urine 01/16/2018 Negative     EXTBreath Alcohol 01/16/2018 0 000     Color, UA 01/17/2018 Yellow     Clarity, UA 01/17/2018 Clear     Specific Gravity, UA 01/17/2018 1 021     pH, UA 01/17/2018 6 0     Leukocytes, UA 01/17/2018 Negative     Nitrite, UA 01/17/2018 Negative     Protein, UA 01/17/2018 Negative     Glucose, UA 01/17/2018 Negative     Ketones, UA 01/17/2018 Negative     Urobilinogen, UA 01/17/2018 1 0     Bilirubin, UA 01/17/2018 Interference- unable to analyze*    Blood, UA 01/17/2018 Negative     Cholesterol 01/17/2018 152     Triglycerides 01/17/2018 109     HDL, Direct 01/17/2018 52     LDL Calculated 01/17/2018 78     Sodium 01/17/2018 140     Potassium 01/17/2018 4 2     Chloride 01/17/2018 104     CO2 01/17/2018 30     Anion Gap 01/17/2018 6     BUN 01/17/2018 12     Creatinine 01/17/2018 0 95     Glucose 01/17/2018 86     Glucose, Fasting 01/17/2018 86     Calcium 01/17/2018 9 7     AST 01/17/2018 212*    ALT 01/17/2018 650*    Alkaline Phosphatase 01/17/2018 91     Total Protein 01/17/2018 8 2     Albumin 01/17/2018 4 1     Total Bilirubin 01/17/2018 0 86     eGFR 01/17/2018 114     WBC 01/17/2018 4 91     RBC 01/17/2018 4 77     Hemoglobin 01/17/2018 15 7     Hematocrit 01/17/2018 44 4     MCV 01/17/2018 93     MCH 01/17/2018 32 9     MCHC 01/17/2018 35 4     RDW 01/17/2018 12 7     MPV 01/17/2018 9 2     Platelets 18/49/1768 210     nRBC 01/17/2018 0     Neutrophils Relative 01/17/2018 53     Lymphocytes Relative 01/17/2018 35     Monocytes Relative 01/17/2018 10     Eosinophils Relative 01/17/2018 2     Basophils Relative 01/17/2018 0     Neutrophils Absolute 01/17/2018 2 54     Lymphocytes Absolute 01/17/2018 1 73     Monocytes Absolute 01/17/2018 0 49     Eosinophils Absolute 01/17/2018 0 12     Basophils Absolute 01/17/2018 0 02     RPR 01/17/2018 Non-Reactive     TSH 3RD GENERATON 01/17/2018 1 300     Monotest 01/17/2018 Negative     Carbamazepine Lvl 01/20/2018 7 4     WBC 01/20/2018 5 02     RBC 01/20/2018 4 89     Hemoglobin 01/20/2018 16 1     Hematocrit 01/20/2018 45 8     MCV 01/20/2018 94     MCH 01/20/2018 32 9     MCHC 01/20/2018 35 2     RDW 01/20/2018 12 7     MPV 01/20/2018 9 2     Platelets 55/53/8906 221     nRBC 01/20/2018 0     Neutrophils Relative 01/20/2018 53     Lymphocytes Relative 01/20/2018 32     Monocytes Relative 01/20/2018 12     Eosinophils Relative 01/20/2018 3     Basophils Relative 01/20/2018 0     Neutrophils Absolute 01/20/2018 2 63     Lymphocytes Absolute 01/20/2018 1 61     Monocytes Absolute 01/20/2018 0 60     Eosinophils Absolute 01/20/2018 0 16     Basophils Absolute 01/20/2018 0 02     Sodium 01/20/2018 141     Potassium 01/20/2018 3 8     Chloride 01/20/2018 106     CO2 01/20/2018 31     Anion Gap 01/20/2018 4     BUN 01/20/2018 15     Creatinine 01/20/2018 0 90     Glucose 01/20/2018 76     Glucose, Fasting 01/20/2018 76     Calcium 01/20/2018 9 8     eGFR 01/20/2018 122     Total Bilirubin 01/20/2018 0 57     Bilirubin, Direct 01/20/2018 0 19     Alkaline Phosphatase 01/20/2018 91     AST 01/20/2018 124*    ALT 01/20/2018 519*    Total Protein 01/20/2018 8 9*    Albumin 01/20/2018 4 4        Mental Status Evaluation:  Appearance:  age appropriate and casually dressed   Behavior:  normal   Speech:  normal pitch and normal volume   Mood:  less depressed and less anxious   Affect:  More anxious today as we talked more about how to deal with anxiety   Thought Process:  goal directed   Thought Content:  No overt delusions   Perceptual Disturbances: None   Risk Potential: Suicidal Ideations none   Sensorium:  person, place and time/date   Cognition:  grossly intact   Consciousness:  alert    Attention: attention span and concentration were age appropriate   Insight:  Improving Judgment: Improving   Gait/Station: Normal   Motor Activity: no abnormal movements     Progress Toward Goals:  Improved    Assessment/Plan   Principal Problem:    Severe episode of recurrent major depressive disorder, without psychotic features (HCC)  Active Problems:    Benzodiazepine abuse    Lymphadenopathy    Bloody stools    Hepatitis C    Constipation   Medications:  Tegretol 200 mg in the morning  Clonidine 0 15 mg twice a day  Clonidine 0 3 at bedtime  Cymbalta 30 mg daily  Gabapentin 100 mg 3 times a day    Recommended Treatment: Continue with group therapy, milieu therapy and occupational therapy  Risks, benefits and possible side effects of Medications:   Risks, benefits, and possible side effects of medications explained to patient and patient verbalizes understanding  Counseling / Coordination of Care  Total floor / unit time spent today 35 minutes  Greater than 50% of total time was spent with the patient and / or family counseling and / or coordination of care

## 2018-01-21 NOTE — PROGRESS NOTES
Patient out in milieu and is pleasant and cooperative  He denies SI  Requested something for back pain and was medicated for same

## 2018-01-21 NOTE — PROGRESS NOTES
Pt denies SI and any other symptoms  States his depression has decreased, become more manageable and feels his mood is more stable  Pt is hopeful to d/c to an "intensive outpatient rehab that also focuses on mental health" and is cooperative and pleasant

## 2018-01-22 VITALS
TEMPERATURE: 97.5 F | SYSTOLIC BLOOD PRESSURE: 118 MMHG | HEART RATE: 106 BPM | HEIGHT: 72 IN | DIASTOLIC BLOOD PRESSURE: 72 MMHG | WEIGHT: 150 LBS | BODY MASS INDEX: 20.32 KG/M2 | RESPIRATION RATE: 16 BRPM | OXYGEN SATURATION: 100 %

## 2018-01-22 RX ORDER — DOCUSATE SODIUM 100 MG/1
100 CAPSULE, LIQUID FILLED ORAL 2 TIMES DAILY
Qty: 60 CAPSULE | Refills: 0 | Status: SHIPPED | OUTPATIENT
Start: 2018-01-22 | End: 2021-04-07

## 2018-01-22 RX ORDER — CARBAMAZEPINE 200 MG/1
200 TABLET ORAL 2 TIMES DAILY
Qty: 60 TABLET | Refills: 0 | Status: SHIPPED | OUTPATIENT
Start: 2018-01-22 | End: 2021-04-07

## 2018-01-22 RX ORDER — GABAPENTIN 800 MG/1
800 TABLET ORAL 3 TIMES DAILY
Qty: 90 TABLET | Refills: 0 | Status: SHIPPED | OUTPATIENT
Start: 2018-01-22 | End: 2021-04-07

## 2018-01-22 RX ORDER — METHOCARBAMOL 500 MG/1
1000 TABLET, FILM COATED ORAL EVERY 12 HOURS PRN
Qty: 30 TABLET | Refills: 1 | Status: SHIPPED | OUTPATIENT
Start: 2018-01-22 | End: 2021-04-07

## 2018-01-22 RX ORDER — CLONIDINE HYDROCHLORIDE 0.3 MG/1
0.3 TABLET ORAL
Qty: 30 TABLET | Refills: 0 | Status: SHIPPED | OUTPATIENT
Start: 2018-01-22 | End: 2021-04-07

## 2018-01-22 RX ORDER — POLYETHYLENE GLYCOL 3350 17 G/17G
17 POWDER, FOR SOLUTION ORAL 2 TIMES DAILY
Qty: 14 EACH | Refills: 3 | Status: SHIPPED | OUTPATIENT
Start: 2018-01-22 | End: 2021-04-07

## 2018-01-22 RX ORDER — CLONIDINE HYDROCHLORIDE 0.3 MG/1
0.15 TABLET ORAL 2 TIMES DAILY
Qty: 30 TABLET | Refills: 0 | Status: SHIPPED | OUTPATIENT
Start: 2018-01-22 | End: 2018-02-21

## 2018-01-22 RX ORDER — SENNOSIDES 8.6 MG
2 TABLET ORAL
Qty: 30 EACH | Refills: 1 | Status: SHIPPED | OUTPATIENT
Start: 2018-01-22 | End: 2021-04-07

## 2018-01-22 RX ORDER — DULOXETIN HYDROCHLORIDE 30 MG/1
30 CAPSULE, DELAYED RELEASE ORAL DAILY
Qty: 30 CAPSULE | Refills: 0 | Status: SHIPPED | OUTPATIENT
Start: 2018-01-23 | End: 2021-04-07

## 2018-01-22 RX ADMIN — NICOTINE 1 PATCH: 21 PATCH, EXTENDED RELEASE TRANSDERMAL at 08:10

## 2018-01-22 RX ADMIN — DOCUSATE SODIUM 100 MG: 100 CAPSULE, LIQUID FILLED ORAL at 08:10

## 2018-01-22 RX ADMIN — CARBAMAZEPINE 200 MG: 200 TABLET ORAL at 08:10

## 2018-01-22 RX ADMIN — DULOXETINE HYDROCHLORIDE 30 MG: 30 CAPSULE, DELAYED RELEASE ORAL at 08:10

## 2018-01-22 RX ADMIN — METHOCARBAMOL 1000 MG: 500 TABLET ORAL at 08:13

## 2018-01-22 RX ADMIN — CLONIDINE HYDROCHLORIDE 0.15 MG: 0.3 TABLET ORAL at 08:50

## 2018-01-22 RX ADMIN — POLYETHYLENE GLYCOL 3350 17 G: 17 POWDER, FOR SOLUTION ORAL at 08:10

## 2018-01-22 RX ADMIN — GABAPENTIN 800 MG: 400 CAPSULE ORAL at 08:10

## 2018-01-22 NOTE — PROGRESS NOTES
Patient about the unit  Pleasant and cooperative  Denies all symptoms at this time  Patient is hopeful for discharge today  RN also reminded patient of stool specimen that still needs to be obtained  Will monitor

## 2018-01-22 NOTE — CASE MANAGEMENT
Pt reports he is ready for discharge, bright affect, denies any feelings of depression or anxiety and no ideations of self harm  Pt demonstrates understanding of medication education and outpt appts along with drug and alcohol treatment/counseling and verifies he will go to all of his outpt appts and take his medications as prescribed  Pt feels this hospital admission was successful for him and that he has significantly improved due to his stay  CM had also left message for pt's father that he was being discharged and that he was welcome to call with any questions or concerns, he did not return call

## 2018-01-22 NOTE — DISCHARGE INSTR - LAB
Contact Information: If you have any questions, concerns, pended studies, tests and/or procedures, or emergencies regarding your inpatient behavioral health visit  Please contact [facility] [phone] and ask to speak to a , nurse or physician  A contact is available 24 hours/ 7 days a week at this number  Summary of Procedures Performed During your Stay:  Below is a list of major procedures performed during your hospital stay and a summary of results:  - No major procedures performed  Pending Studies     Start     Ordered    01/20/18 0600  Vitamin D Panel  Morning draw      01/19/18 1446    01/17/18 1511  Occult blood 1-3, stool  Once      01/17/18 1510        If studies are pending at discharge, follow up with your PCP and/or referring provider

## 2018-01-22 NOTE — PLAN OF CARE
Problem: Ineffective Coping  Goal: Participates in unit activities  Interventions:  - Provide therapeutic environment   - Provide required programming   - Redirect inappropriate behaviors    Outcome: Completed Date Met: 01/22/18

## 2018-01-22 NOTE — PLAN OF CARE
Problem: SELF HARM/SUICIDALITY  Goal: Will have no self-injury during hospital stay  INTERVENTIONS:  - Q 15 MINUTES: Routine safety checks  - Q WAKING SHIFT & PRN: Assess risk to determine if routine checks are adequate to maintain patient safety  - Encourage patient to participate actively in care by formulating a plan to combat response to suicidal ideation, identify supports and resources   Outcome: Adequate for Discharge      Problem: DEPRESSION  Goal: Will be euthymic at discharge  INTERVENTIONS:  - Administer medication as ordered  - Provide emotional support via 1:1 interaction with staff  - Encourage involvement in milieu/groups/activities  - Monitor for social isolation   Outcome: Adequate for Discharge      Problem: ANXIETY  Goal: Will report anxiety at manageable levels  INTERVENTIONS:  - Administer medication as ordered  - Teach and encourage coping skills  - Provide emotional support  - Assess patient/family for anxiety and ability to cope   Outcome: Adequate for Discharge    Goal: By discharge: Patient will verbalize 2 strategies to deal with anxiety  Interventions:  - Identify any obvious source/trigger to anxiety  - Staff will assist patient in applying identified coping technique/skills  - Encourage attendance of scheduled groups and activities   Outcome: Adequate for Discharge      Problem: SUBSTANCE USE/ABUSE  Goal: By discharge, will develop insight into their chemical dependency and sustain motivation to continue in recovery  INTERVENTIONS:  - Attends all daily group sessions and scheduled AA groups  - Actively practices coping skills through participation in the therapeutic community and adherence to program rules  - Reviews and completes assignments from individual treatment plan  - Assist patient development of understanding of their personal cycle of addiction and relapse triggers   Outcome: Adequate for Discharge    Goal: By discharge, patient will have ongoing treatment plan addressing chemical dependency  INTERVENTIONS:  - Assist patient with resources and/or appointments for ongoing recovery based living   Outcome: Adequate for Discharge      Problem: DISCHARGE PLANNING  Goal: Discharge to home or other facility with appropriate resources  INTERVENTIONS:  - Identify barriers to discharge w/patient and caregiver  - Arrange for needed discharge resources and transportation as appropriate  - Identify discharge learning needs (meds, wound care, etc )  - Arrange for interpretive services to assist at discharge as needed  - Refer to Case Management Department for coordinating discharge planning if the patient needs post-hospital services based on physician/advanced practitioner order or complex needs related to functional status, cognitive ability, or social support system   Outcome: Adequate for Discharge      Problem: Ineffective Coping  Goal: Participates in unit activities  Interventions:  - Provide therapeutic environment   - Provide required programming   - Redirect inappropriate behaviors    Outcome: Adequate for Discharge

## 2018-01-22 NOTE — PROGRESS NOTES
Patient not seen or examined today  Chart reviewed  Appears patient may be discharged based on review of psych chart and I have no contraindication to this  Please see A/P from yesterday for all details and plan  Will sign off  Call with questions       Tito Poole PA-C

## 2018-01-22 NOTE — PLAN OF CARE
Problem: SELF HARM/SUICIDALITY  Goal: Will have no self-injury during hospital stay  INTERVENTIONS:  - Q 15 MINUTES: Routine safety checks  - Q WAKING SHIFT & PRN: Assess risk to determine if routine checks are adequate to maintain patient safety  - Encourage patient to participate actively in care by formulating a plan to combat response to suicidal ideation, identify supports and resources   Outcome: Completed Date Met: 01/22/18      Problem: DEPRESSION  Goal: Will be euthymic at discharge  INTERVENTIONS:  - Administer medication as ordered  - Provide emotional support via 1:1 interaction with staff  - Encourage involvement in milieu/groups/activities  - Monitor for social isolation   Outcome: Completed Date Met: 01/22/18      Problem: ANXIETY  Goal: Will report anxiety at manageable levels  INTERVENTIONS:  - Administer medication as ordered  - Teach and encourage coping skills  - Provide emotional support  - Assess patient/family for anxiety and ability to cope   Outcome: Completed Date Met: 01/22/18    Goal: By discharge: Patient will verbalize 2 strategies to deal with anxiety  Interventions:  - Identify any obvious source/trigger to anxiety  - Staff will assist patient in applying identified coping technique/skills  - Encourage attendance of scheduled groups and activities   Outcome: Completed Date Met: 01/22/18      Problem: SUBSTANCE USE/ABUSE  Goal: By discharge, will develop insight into their chemical dependency and sustain motivation to continue in recovery  INTERVENTIONS:  - Attends all daily group sessions and scheduled AA groups  - Actively practices coping skills through participation in the therapeutic community and adherence to program rules  - Reviews and completes assignments from individual treatment plan  - Assist patient development of understanding of their personal cycle of addiction and relapse triggers   Outcome: Completed Date Met: 01/22/18    Goal: By discharge, patient will have ongoing treatment plan addressing chemical dependency  INTERVENTIONS:  - Assist patient with resources and/or appointments for ongoing recovery based living   Outcome: Completed Date Met: 01/22/18      Problem: DISCHARGE PLANNING  Goal: Discharge to home or other facility with appropriate resources  INTERVENTIONS:  - Identify barriers to discharge w/patient and caregiver  - Arrange for needed discharge resources and transportation as appropriate  - Identify discharge learning needs (meds, wound care, etc )  - Arrange for interpretive services to assist at discharge as needed  - Refer to Case Management Department for coordinating discharge planning if the patient needs post-hospital services based on physician/advanced practitioner order or complex needs related to functional status, cognitive ability, or social support system   Outcome: Completed Date Met: 01/22/18

## 2018-01-22 NOTE — PROGRESS NOTES
Pt pleasant and about unit  Denies all symptoms  Hopeful for d/c soon  Compliant with meds and care  Will monitor

## 2018-01-23 NOTE — DISCHARGE SUMMARY
Discharge Summary - [address] [facility] [address] 25 y o  male MRN: [id number]  Unit/Bed#: MS6 908-06 Encounter: [id number]     Admission Date: 1/16/2018         Discharge Date: 1/22/2018      Attending Psychiatrist: HAMZAH Velasquez     Reason for Admission/HPI:     Reina Knight is a 25 y o  male with a history of Major Depressive Disorder, psychosis, anxiety, self-abusive behavior, substance use and personality disorder who was admitted to the inpatient psychiatric unit on a voluntary 12 commitment basis due to depression, anxiety, substance abuse, inability to care for self because of mental illness, suicidal ideation and suicidal ideation with plan to hang self      Symptoms prior to admission included worsening depression, depression, feeling depressed, suicidal ideation, hopelessness, helplessness, poor concentration, poor appetite, weight loss and difficulty sleeping  Onset of symptoms was abrupt starting 1 week ago with progressively worsening course since that time  Stressors preceding admission included drug use problems  On initial evaluation after admission to the inpatient psychiatric unit the patient depressed, anxious, had restricted affect telling that in addition to his depression he also suffered from mild withdrawal from Suboxone his stop several days ago after he was admitted to [facility]  Drug and alcohol residential program   The patient did not like that program over and relapsed on benzodiazepines  The patient stated that he used to bite different benzodiazepines from the Internet and recall completed the strained and dosages to adjust to volume but the patient know how to how much to use to get high but not overdosing  The patient also stated had history of heroin addiction    Had 1 suicidal attempt in the past by OD on benzos and prescriprionn medications and after 24 hours observtions was referred to outpatient psych at     Prescription opioids - lost control since 24 y o  No other drugs - opioid and benzodiazepine  Natural high - a couple of day a month   Depression improved on pain Satira Nixon since 16 y,o  Clonidine 0 3 hs, 0 15 bid  Neurontin 800 mg tid -      Back pain - muscle spasm syndrome  Robaxin  Hospital Course:     Mr Edilma Staley was admitted and all appropriate precautions were started  Pt was oriented to the unit  His treatment, including medication management and therapy was discussed with the patient, and he agreed  During the hospitalization the patient was encouraged to attend individual therapy, group therapy, milieu therapy and occupational therapy  Motivational interview was provided regarding alcohol and drug abuse, pt was receptive  Patient condition initially remained depressed was restricted affect  He stated that he also suffers some residual symptoms of withdrawal after his stop Suboxone  However clonidine will help him was residual withdrawal symptoms  We discussed medication management of his depression which likely associated with bipolar disorder, and anxiety and a combination of Tegretol and Cymbalta was selected to address the patient's mood and anxiety issues Cymbalta was also selected because it could contribute to treatment of patient's chronic back pain calming combination was Robaxin and gabapentin  The significantly improved after his  medications were started  Pt agreed to start his medication after discussion of potential benefits and side effects  Risks, benefits, and possible side effects of medications explained to patient and patient verbalizes understanding and agreement for treatment  Lv Couch He also participated in therapy  Pt  sleep improved and appetite improved as well  During his  treatment at the Unit the patient did not have any episodes of self-harming or harming to others behaviors, was cooperative with the treatment plan  Tolerated medications without side effects  Carbamazepine level was therapeutic  Patient has chronic constipation and blood in his stool was evaluated by Medicine and they suggested outpatient follow-up  Vitals were stable  Denied any SI or HI  We discussed safety plan and the patient selected 1st to talk to his father who is the patient has close relation to and the 2nd to for his father is not available go to nearest emergency room to discuss his suicidal ideations if they reappear in his mind  However the patient stated that he did not have any and that also he did not have an never had any thoughts of killing other people  We discuss a gun collection of his father in the patient home and the  stated that she had received a statement from patient's father that he would locked all the guns in the appropriate places so the patient will not be able to access the guns  The patient was also in agreement was such restriction  I discussed the medication regimen and possible side effects of the medications with the patient prior to discharge  At the time of discharge Mr Adia Romero was tolerating psychiatric medications  He denied any cravings or urges to use any street drugs and no longer wants to buy benzodiazepines feeling that his mood improved significantly and he did not feel any anxiety at all  He was looking forward his participation in drug rehab program Please see After Discharge Summary for a completed list of discharge medications  Safety plan was discussed and approved by the patient  He was not manic, depressed, angry, or confused or psychotic on a day of discharge and was accountable for his actions  I discussed outpatient follow up with the patient, which was arranged by the unit  upon discharge  Safety plan was discussed and approved by Mr Adia Romero   Reviewed with the patient importance of compliance with medications and outpatient treatment after discharge  The patient was competent to understand of risks and benefits of his actions         Mental Status at time of Discharge:     Mental Status Evaluation:    Appearance:  dressed appropriately, casually dressed   Behavior:  normal, pleasant, cooperative   Mood:  improved, euthymic   Affect: normal range and intensity    Speech:  normal rate and volume   Language: appropriate   Thought Process:  normal   Associations: intact associations   Thought Content:  normal   Perceptual Disturbances: no auditory hallucinations, no visual hallucinations, does not appear responding to internal stimuli   Risk Potential: Suicidal ideation - None  Homicidal ideation - None  Potential for aggression - No   Sensorium:  oriented to person, place and time   Memory:  recent and remote memory grossly intact   Consciousness:  alert and awake   Attention: attention span and concentration are normal   Intellect: normal   Fund of Knowledge: awareness of current events appropriate   Insight:  normal   Judgment: normal   Muscle Tone: normal   Gait/Station: normal gait/station and normal balance   Motor Activity: no abnormal movements         Discharge Diagnosis:   Patient Active Problem List   Diagnosis    Severe episode of recurrent major depressive disorder, without psychotic features (HCC)    Benzodiazepine abuse    Lymphadenopathy    Bloody stools    Hepatitis C    Constipation       Discharge Medications:  See after visit summary for reconciled discharge medications provided to patient and family  Discharge instructions/Information to patient and family:   See after visit summary for information provided to patient and family  Provisions for Follow-Up Care:  See after visit summary for information related to follow-up care and any pertinent home health orders  Discharge Statement   I spent 45 minutes discharging the patient  This time was spent on the day of discharge  I had direct contact with the patient on the day of discharge   Additional documentation is required if more than 30 minutes were spent on discharge  Portions of the record may have been created with voice recognition software  Occasional wrong word or "sound a like" substitutions may have occurred due to the inherent limitations of voice recognition software  Read the chart carefully and recognize, using context, where substitutions have occurred  There may be translation, syntax,  or grammatical errors  If you have any questions, please contact the dictating provider

## 2018-01-24 LAB
25(OH)D2 SERPL-MCNC: <1 NG/ML
25(OH)D3 SERPL-MCNC: 22 NG/ML
25(OH)D3+25(OH)D2 SERPL-MCNC: 23 NG/ML

## 2021-04-07 ENCOUNTER — HOSPITAL ENCOUNTER (EMERGENCY)
Facility: HOSPITAL | Age: 26
Discharge: HOME | End: 2021-04-08
Attending: EMERGENCY MEDICINE
Payer: COMMERCIAL

## 2021-04-07 DIAGNOSIS — R07.9 CHEST PAIN: Primary | ICD-10-CM

## 2021-04-07 DIAGNOSIS — M54.10 RADICULOPATHY: ICD-10-CM

## 2021-04-07 PROCEDURE — 99284 EMERGENCY DEPT VISIT MOD MDM: CPT | Performed by: EMERGENCY MEDICINE

## 2021-04-07 PROCEDURE — 99285 EMERGENCY DEPT VISIT HI MDM: CPT

## 2021-04-07 PROCEDURE — 93005 ELECTROCARDIOGRAM TRACING: CPT

## 2021-04-07 RX ORDER — FAMOTIDINE 40 MG/1
40 TABLET, FILM COATED ORAL
COMMUNITY

## 2021-04-07 RX ORDER — BUPRENORPHINE AND NALOXONE 8; 2 MG/1; MG/1
FILM, SOLUBLE BUCCAL; SUBLINGUAL
COMMUNITY

## 2021-04-07 RX ORDER — CLONIDINE 0.1 MG/24H
1 PATCH, EXTENDED RELEASE TRANSDERMAL WEEKLY
COMMUNITY

## 2021-04-07 RX ORDER — DEXTROAMPHETAMINE SACCHARATE, AMPHETAMINE ASPARTATE, DEXTROAMPHETAMINE SULFATE AND AMPHETAMINE SULFATE 5; 5; 5; 5 MG/1; MG/1; MG/1; MG/1
20 TABLET ORAL
COMMUNITY

## 2021-04-07 RX ORDER — DEXTROAMPHETAMINE SACCHARATE, AMPHETAMINE ASPARTATE MONOHYDRATE, DEXTROAMPHETAMINE SULFATE AND AMPHETAMINE SULFATE 5; 5; 5; 5 MG/1; MG/1; MG/1; MG/1
20 CAPSULE, EXTENDED RELEASE ORAL
COMMUNITY

## 2021-04-07 RX ORDER — BUPRENORPHINE HYDROCHLORIDE AND NALOXONE HYDROCHLORIDE DIHYDRATE 2; .5 MG/1; MG/1
0.5 TABLET SUBLINGUAL DAILY
COMMUNITY

## 2021-04-07 RX ORDER — ONDANSETRON 4 MG/1
TABLET, FILM COATED ORAL
COMMUNITY

## 2021-04-08 ENCOUNTER — APPOINTMENT (EMERGENCY)
Dept: RADIOLOGY | Facility: HOSPITAL | Age: 26
End: 2021-04-08
Payer: COMMERCIAL

## 2021-04-08 ENCOUNTER — TELEPHONE (OUTPATIENT)
Dept: PHYSICAL THERAPY | Facility: OTHER | Age: 26
End: 2021-04-08

## 2021-04-08 VITALS
DIASTOLIC BLOOD PRESSURE: 65 MMHG | OXYGEN SATURATION: 98 % | SYSTOLIC BLOOD PRESSURE: 111 MMHG | HEIGHT: 72 IN | BODY MASS INDEX: 21.67 KG/M2 | TEMPERATURE: 97.6 F | RESPIRATION RATE: 17 BRPM | HEART RATE: 65 BPM | WEIGHT: 160 LBS

## 2021-04-08 LAB
ALBUMIN SERPL BCP-MCNC: 4.2 G/DL (ref 3.5–5)
ALP SERPL-CCNC: 56 U/L (ref 46–116)
ALT SERPL W P-5'-P-CCNC: 33 U/L (ref 12–78)
ANION GAP SERPL CALCULATED.3IONS-SCNC: 7 MMOL/L (ref 4–13)
AST SERPL W P-5'-P-CCNC: 16 U/L (ref 5–45)
BASOPHILS # BLD AUTO: 0.08 THOUSANDS/ΜL (ref 0–0.1)
BASOPHILS NFR BLD AUTO: 1 % (ref 0–1)
BILIRUB SERPL-MCNC: 0.27 MG/DL (ref 0.2–1)
BUN SERPL-MCNC: 21 MG/DL (ref 5–25)
CALCIUM SERPL-MCNC: 9.1 MG/DL (ref 8.3–10.1)
CHLORIDE SERPL-SCNC: 107 MMOL/L (ref 100–108)
CO2 SERPL-SCNC: 31 MMOL/L (ref 21–32)
CREAT SERPL-MCNC: 1.12 MG/DL (ref 0.6–1.3)
D DIMER PPP FEU-MCNC: 0.27 UG/ML FEU
EOSINOPHIL # BLD AUTO: 0.43 THOUSAND/ΜL (ref 0–0.61)
EOSINOPHIL NFR BLD AUTO: 6 % (ref 0–6)
ERYTHROCYTE [DISTWIDTH] IN BLOOD BY AUTOMATED COUNT: 11.4 % (ref 11.6–15.1)
FLUAV RNA RESP QL NAA+PROBE: NEGATIVE
FLUBV RNA RESP QL NAA+PROBE: NEGATIVE
GFR SERPL CREATININE-BSD FRML MDRD: 91 ML/MIN/1.73SQ M
GLUCOSE SERPL-MCNC: 60 MG/DL (ref 65–140)
GLUCOSE SERPL-MCNC: 76 MG/DL (ref 65–140)
HCT VFR BLD AUTO: 43.1 % (ref 36.5–49.3)
HGB BLD-MCNC: 14.7 G/DL (ref 12–17)
IMM GRANULOCYTES # BLD AUTO: 0 THOUSAND/UL (ref 0–0.2)
IMM GRANULOCYTES NFR BLD AUTO: 0 % (ref 0–2)
LYMPHOCYTES # BLD AUTO: 2.21 THOUSANDS/ΜL (ref 0.6–4.47)
LYMPHOCYTES NFR BLD AUTO: 32 % (ref 14–44)
MCH RBC QN AUTO: 32.5 PG (ref 26.8–34.3)
MCHC RBC AUTO-ENTMCNC: 34.1 G/DL (ref 31.4–37.4)
MCV RBC AUTO: 95 FL (ref 82–98)
MONOCYTES # BLD AUTO: 0.52 THOUSAND/ΜL (ref 0.17–1.22)
MONOCYTES NFR BLD AUTO: 8 % (ref 4–12)
NEUTROPHILS # BLD AUTO: 3.62 THOUSANDS/ΜL (ref 1.85–7.62)
NEUTS SEG NFR BLD AUTO: 53 % (ref 43–75)
NRBC BLD AUTO-RTO: 0 /100 WBCS
PLATELET # BLD AUTO: 220 THOUSANDS/UL (ref 149–390)
PMV BLD AUTO: 8.6 FL (ref 8.9–12.7)
POTASSIUM SERPL-SCNC: 4.7 MMOL/L (ref 3.5–5.3)
PROT SERPL-MCNC: 7.4 G/DL (ref 6.4–8.2)
RBC # BLD AUTO: 4.53 MILLION/UL (ref 3.88–5.62)
RSV RNA RESP QL NAA+PROBE: NEGATIVE
SARS-COV-2 RNA RESP QL NAA+PROBE: NEGATIVE
SODIUM SERPL-SCNC: 145 MMOL/L (ref 136–145)
TROPONIN I SERPL-MCNC: <0.02 NG/ML
WBC # BLD AUTO: 6.86 THOUSAND/UL (ref 4.31–10.16)

## 2021-04-08 PROCEDURE — 84484 ASSAY OF TROPONIN QUANT: CPT | Performed by: EMERGENCY MEDICINE

## 2021-04-08 PROCEDURE — 85025 COMPLETE CBC W/AUTO DIFF WBC: CPT | Performed by: EMERGENCY MEDICINE

## 2021-04-08 PROCEDURE — 85379 FIBRIN DEGRADATION QUANT: CPT | Performed by: EMERGENCY MEDICINE

## 2021-04-08 PROCEDURE — 0241U HB NFCT DS VIR RESP RNA 4 TRGT: CPT | Performed by: EMERGENCY MEDICINE

## 2021-04-08 PROCEDURE — 71045 X-RAY EXAM CHEST 1 VIEW: CPT

## 2021-04-08 PROCEDURE — 82948 REAGENT STRIP/BLOOD GLUCOSE: CPT

## 2021-04-08 PROCEDURE — 36415 COLL VENOUS BLD VENIPUNCTURE: CPT | Performed by: EMERGENCY MEDICINE

## 2021-04-08 PROCEDURE — 80053 COMPREHEN METABOLIC PANEL: CPT | Performed by: EMERGENCY MEDICINE

## 2021-04-08 RX ORDER — PREDNISONE 20 MG/1
40 TABLET ORAL ONCE
Status: COMPLETED | OUTPATIENT
Start: 2021-04-08 | End: 2021-04-08

## 2021-04-08 RX ORDER — FAMOTIDINE 20 MG/1
20 TABLET, FILM COATED ORAL ONCE
Status: COMPLETED | OUTPATIENT
Start: 2021-04-08 | End: 2021-04-08

## 2021-04-08 RX ORDER — PREDNISONE 20 MG/1
TABLET ORAL
Qty: 10 TABLET | Refills: 0 | Status: SHIPPED | OUTPATIENT
Start: 2021-04-08

## 2021-04-08 RX ADMIN — PREDNISONE 40 MG: 20 TABLET ORAL at 01:04

## 2021-04-08 RX ADMIN — FAMOTIDINE 20 MG: 20 TABLET, FILM COATED ORAL at 01:04

## 2021-04-08 NOTE — ED PROVIDER NOTES
History  Chief Complaint   Patient presents with    Chest Pain     Having constant chest pain for about a week in the middle of the chest      Leg Pain     Using heating pads every day for a few weeks that run down the back side of both legs thats sharp behind the calves and knees bilaterally     43-year-old male presents with multiple complaints  He he states he has had chest pain which is anterior nonradiating for the last week  He has a couple hours every day he denies pleuritic pain he is occasionally short of breath with dyspnea on exertion  He denies any fever chills cough or upper respiratory complaints  He denies prior history of DVT or PE  He has had no rash  He also complains of pain which radiates from the back was buttocks down to his calf is a very sharp  pain pain with occasional tingling sensation he denies any bowel or bladder incontinence; he has no back pain no history of falls or trauma he had a rather physical job working in a [employer] up until several weeks ago and the problem seemed to start there no history of trauma or falls  Denies any upper respiratory complaints is just earache sore throat or cough  Had no nausea vomiting diarrhea has intermittent abdominal pain which is chronic for him and unchanged  Patient is currently in recovery on suboxone and has been sober for 4 years          Prior to Admission Medications   Prescriptions Last Dose Informant Patient Reported? Taking?    amphetamine-dextroamphetamine (ADDERALL XR) 20 MG 24 hr capsule 4/7/2021 at Unknown time  Yes Yes   Sig: Take 20 mg by mouth   amphetamine-dextroamphetamine (ADDERALL) 20 mg tablet 4/7/2021 at Unknown time  Yes Yes   Sig: Take 20 mg by mouth   buprenorphine-naloxone (SUBOXONE) 2-0 5 mg per SL tablet 4/7/2021 at Unknown time  Yes Yes   Sig: Place 0 5 tablets under the tongue daily   buprenorphine-naloxone (Suboxone) 8-2 mg 4/7/2021 at Unknown time  Yes Yes   Sig: Take by mouth   cloNIDine (CATAPRES-TTS-1) 0 1 mg/24 hr 2021 at Unknown time  Yes Yes   Sig: Place 1 patch on the skin once a week   famotidine (PEPCID) 40 MG tablet 2021 at Unknown time  Yes Yes   Sig: Take 40 mg by mouth   nicotine polacrilex (NICORETTE) 2 mg gum Not Taking at Unknown time  No No   Sig: Chew 1 each every 2 (two) hours as needed for smoking cessation   Patient not taking: Reported on 2021   ondansetron (ZOFRAN) 4 mg tablet 2021 at Unknown time  Yes Yes   Sig: Take by mouth      Facility-Administered Medications: None       Past Medical History:   Diagnosis Date    Addiction to drug ([place] [address] )     Anxiety     Chronic pain disorder     Depression     Heart disease     pt states "valvular heart disease"    Hepatitis C 2018    Muscle spasm     Psychiatric illness     Sciatica     Scoliosis     Sleep difficulties     Substance abuse ([place] [address] )     Suicide attempt ([place] [address] )        Past Surgical History:   Procedure Laterality Date    WISDOM TOOTH EXTRACTION         History reviewed  No pertinent family history  I have reviewed and agree with the history as documented  E-Cigarette/Vaping    E-Cigarette Use Never User      E-Cigarette/Vaping Substances     Social History     Tobacco Use    Smoking status: Former Smoker     Packs/day: 1 00     Quit date: 2019     Years since quittin 0    Smokeless tobacco: Never Used   Substance Use Topics    Alcohol use: No    Drug use: Not Currently     Types: Heroin, Benzodiazepines, Other     Comment: stopped using benzo and prescription pain medication 2018       Review of Systems   Constitutional: Negative for activity change, appetite change, chills and fever  HENT: Negative for congestion, ear pain, rhinorrhea, sneezing and sore throat  Eyes: Negative for discharge  Respiratory: Negative for cough and shortness of breath  Cardiovascular: Positive for chest pain  Negative for leg swelling     Gastrointestinal: Positive for abdominal pain (intermitnat chronic unchanged)  Negative for abdominal distention, blood in stool, diarrhea, nausea and vomiting  Endocrine: Negative for polyuria  Genitourinary: Negative for difficulty urinating, dysuria, frequency and urgency  Musculoskeletal: Negative for back pain, myalgias, neck pain and neck stiffness  Skin: Negative for rash  Neurological: Positive for numbness (lower extremties)  Negative for dizziness, weakness and headaches  Hematological: Negative for adenopathy  Psychiatric/Behavioral: Negative for confusion  All other systems reviewed and are negative  Physical Exam  Physical Exam  Vitals signs and nursing note reviewed  Constitutional:       General: He is not in acute distress  Appearance: He is well-developed  He is not ill-appearing, toxic-appearing or diaphoretic  HENT:      Head: Normocephalic and atraumatic  Right Ear: Tympanic membrane and external ear normal       Left Ear: Tympanic membrane and external ear normal       Nose: Nose normal    Eyes:      General: No scleral icterus  Right eye: No discharge  Left eye: No discharge  Extraocular Movements: Extraocular movements intact  Conjunctiva/sclera: Conjunctivae normal       Pupils: Pupils are equal, round, and reactive to light  Neck:      Musculoskeletal: Normal range of motion and neck supple  Cardiovascular:      Rate and Rhythm: Normal rate and regular rhythm  Pulses: Normal pulses  Heart sounds: Normal heart sounds  Pulmonary:      Effort: Pulmonary effort is normal  No respiratory distress  Breath sounds: Normal breath sounds  No wheezing or rales  Chest:      Chest wall: No tenderness  Abdominal:      General: Bowel sounds are normal  There is no distension  Palpations: Abdomen is soft  There is no mass  Tenderness: There is no abdominal tenderness  There is no right CVA tenderness, left CVA tenderness, guarding or rebound        Comments: Back: no mildline T or L spine tendnernss   Genitourinary:     Comments: Refused rectal exam  Musculoskeletal: Normal range of motion  General: No tenderness or deformity  Lymphadenopathy:      Cervical: No cervical adenopathy  Skin:     General: Skin is warm and dry  Capillary Refill: Capillary refill takes less than 2 seconds  Neurological:      General: No focal deficit present  Mental Status: He is alert and oriented to person, place, and time  Cranial Nerves: No cranial nerve deficit  Sensory: No sensory deficit  Motor: No weakness or abnormal muscle tone        Coordination: Coordination normal       Gait: Gait normal       Deep Tendon Reflexes: Reflexes normal       Comments: Gait steady   Psychiatric:         Mood and Affect: Mood normal          Vital Signs  ED Triage Vitals [04/07/21 2228]   Temperature Pulse Respirations Blood Pressure SpO2   97 6 °F (36 4 °C) 87 12 139/82 97 %      Temp Source Heart Rate Source Patient Position - Orthostatic VS BP Location FiO2 (%)   Temporal Monitor Sitting Right arm --      Pain Score       4           Vitals:    04/07/21 2330 04/08/21 0100 04/08/21 0130 04/08/21 0200   BP: 126/79 134/75 131/83 111/65   Pulse: 74 63 65 65   Patient Position - Orthostatic VS: Sitting Lying Sitting Sitting         Visual Acuity      ED Medications  Medications   predniSONE tablet 40 mg (40 mg Oral Given 4/8/21 0104)   famotidine (PEPCID) tablet 20 mg (20 mg Oral Given 4/8/21 0104)       Diagnostic Studies  Results Reviewed     Procedure Component Value Units Date/Time    Fingerstick Glucose (POCT) [[id number]]  (Normal) Collected: 04/08/21 0205    Lab Status: Final result Updated: 04/08/21 0206     POC Glucose 76 mg/dl     COVID19, Influenza A/B, RSV PCR, [facility] [[id number]]  (Normal) Collected: 04/08/21 0012    Lab Status: Final result Specimen: Nares from Nasopharyngeal Swab Updated: 04/08/21 0101     SARS-CoV-2 Negative     INFLUENZA A PCR Negative     INFLUENZA B PCR Negative     RSV PCR Negative    Narrative: This test has been authorized by FDA under an EUA (Emergency Use Assay) for use by authorized laboratories  Clinical caution and judgement should be used with the interpretation of these results with consideration of the clinical impression and other laboratory testing  Testing reported as "Positive" or "Negative" has been proven to be accurate according to standard laboratory validation requirements  All testing is performed with control materials showing appropriate reactivity at standard intervals      Troponin I [[id number]]  (Normal) Collected: 04/08/21 0012    Lab Status: Final result Specimen: Blood from Arm, Right Updated: 04/08/21 0042     Troponin I <0 02 ng/mL     Comprehensive metabolic panel [[id number]]  (Abnormal) Collected: 04/08/21 0012    Lab Status: Final result Specimen: Blood from Arm, Right Updated: 04/08/21 0035     Sodium 145 mmol/L      Potassium 4 7 mmol/L      Chloride 107 mmol/L      CO2 31 mmol/L      ANION GAP 7 mmol/L      BUN 21 mg/dL      Creatinine 1 12 mg/dL      Glucose 60 mg/dL      Calcium 9 1 mg/dL      AST 16 U/L      ALT 33 U/L      Alkaline Phosphatase 56 U/L      Total Protein 7 4 g/dL      Albumin 4 2 g/dL      Total Bilirubin 0 27 mg/dL      eGFR 91 ml/min/1 73sq m     Narrative:      [place] guidelines for Chronic Kidney Disease (CKD):     Stage 1 with normal or high GFR (GFR > 90 mL/min/1 73 square meters)    Stage 2 Mild CKD (GFR = 60-89 mL/min/1 73 square meters)    Stage 3A Moderate CKD (GFR = 45-59 mL/min/1 73 square meters)    Stage 3B Moderate CKD (GFR = 30-44 mL/min/1 73 square meters)    Stage 4 Severe CKD (GFR = 15-29 mL/min/1 73 square meters)    Stage 5 End Stage CKD (GFR <15 mL/min/1 73 square meters)  Note: GFR calculation is accurate only with a steady state creatinine    D-Dimer [[id number]]  (Normal) Collected: 04/08/21 0012    Lab Status: Final result Specimen: Blood from Arm, Right Updated: 04/08/21 0033     D-Dimer, Quant 0 27 ug/ml FEU     CBC and differential [[id number]]  (Abnormal) Collected: 04/08/21 0012    Lab Status: Final result Specimen: Blood from Arm, Right Updated: 04/08/21 0021     WBC 6 86 Thousand/uL      RBC 4 53 Million/uL      Hemoglobin 14 7 g/dL      Hematocrit 43 1 %      MCV 95 fL      MCH 32 5 pg      MCHC 34 1 g/dL      RDW 11 4 %      MPV 8 6 fL      Platelets 143 Thousands/uL      nRBC 0 /100 WBCs      Neutrophils Relative 53 %      Immat GRANS % 0 %      Lymphocytes Relative 32 %      Monocytes Relative 8 %      Eosinophils Relative 6 %      Basophils Relative 1 %      Neutrophils Absolute 3 62 Thousands/µL      Immature Grans Absolute 0 00 Thousand/uL      Lymphocytes Absolute 2 21 Thousands/µL      Monocytes Absolute 0 52 Thousand/µL      Eosinophils Absolute 0 43 Thousand/µL      Basophils Absolute 0 08 Thousands/µL                  XR chest 1 view portable   ED Interpretation by Elva Toney MD (04/08 0202)   Read by me; Radiologist to provide formal interpretation   No acute process                 Procedures  ECG 12 Lead Documentation Only    Date/Time: 4/7/2021 11:38 PM  Performed by: Elva Toney MD  Authorized by: Elva Toney MD     Indications / Diagnosis:  Cp  ECG reviewed by me, the ED Provider: yes    Patient location:  ED  Previous ECG:     Previous ECG:  Unavailable  Rate:     ECG rate:  66    ECG rate assessment: normal    Rhythm:     Rhythm: sinus rhythm    QRS:     QRS axis:  Normal  Comments:      No acute ischemic changes             ED Course  ED Course as of Apr 08 0238   Thu Apr 08, 2021   0006 Patient declines PIV      0203 Patient given soda for BS 60 declined crackers PBJ recheck glucose 76                HEART Risk Score      Most Recent Value   Heart Score Risk Calculator   History  0 Filed at: 04/08/2021 0023   ECG  1 Filed at: 04/08/2021 0023   Age  0 Filed at: 04/08/2021 0023   Risk Factors  1 Filed at: 04/08/2021 0023   Troponin  0 Filed at: 04/08/2021 0023   HEART Score  2 Filed at: 04/08/2021 0023                      SBIRT 20yo+      Most Recent Value   SBIRT (23 yo +)   In order to provide better care to our patients, we are screening all of our patients for alcohol and drug use  Would it be okay to ask you these screening questions? Yes Filed at: 04/07/2021 2231   Initial Alcohol Screen: US AUDIT-C    1  How often do you have a drink containing alcohol?  0 Filed at: 04/07/2021 2231   2  How many drinks containing alcohol do you have on a typical day you are drinking? 0 Filed at: 04/07/2021 2231   3a  Male UNDER 65: How often do you have five or more drinks on one occasion? 0 Filed at: 04/07/2021 2231   3b  FEMALE Any Age, or MALE 65+: How often do you have 4 or more drinks on one occassion? 0 Filed at: 04/07/2021 2231   Audit-C Score  0 Filed at: 04/07/2021 2231   JEEVAN: How many times in the past year have you    Used an illegal drug or used a prescription medication for non-medical reasons? Never Filed at: 04/07/2021 2231                    MDM  Number of Diagnoses or Management Options  Diagnosis management comments: Mdm: will eval for acs, PE, covid, lower extremity symptoms are most consistent with s2 radiculopathy   No fevers diskitis unlikely    Will check D-dimer to evaluate for thromboembolic disease if it is positive recommend he undergo a CTA of the chest and can get outpatient venous Doppler ultrasound as they are currently unavailable      Disposition  Final diagnoses:   Chest pain   Radiculopathy - incomplete s2     Time reflects when diagnosis was documented in both MDM as applicable and the Disposition within this note     Time User Action Codes Description Comment    4/8/2021  2:03 AM Paris Jaskaran Add [R07 9] Chest pain     4/8/2021  2:08 AM Crumpler Jaskaran Add [M54 10] Radiculopathy     4/8/2021  2:08 AM Paris Jaskaran Modify [M54 10] Radiculopathy incomplete s2      ED Disposition     ED Disposition Condition Date/Time Comment    Discharge Stable Thu Apr 8, 2021  2:03 AM Lynn Cormier discharge to home/self care              Follow-up Information     Follow up With Specialties Details Why Contact Info    Jaclyn Reaves MD Family Medicine Call in 1 day recheck of symptoms [address]  [place] [address]  [phone]            Discharge Medication List as of 4/8/2021  2:15 AM      START taking these medications    Details   predniSONE 20 mg tablet 40mg (2-20mg tabs) by mouth daily with food for 5 days, Normal         CONTINUE these medications which have NOT CHANGED    Details   amphetamine-dextroamphetamine (ADDERALL XR) 20 MG 24 hr capsule Take 20 mg by mouth, Historical Med      amphetamine-dextroamphetamine (ADDERALL) 20 mg tablet Take 20 mg by mouth, Historical Med      buprenorphine-naloxone (SUBOXONE) 2-0 5 mg per SL tablet Place 0 5 tablets under the tongue daily, Historical Med      buprenorphine-naloxone (Suboxone) 8-2 mg Take by mouth, Historical Med      cloNIDine (CATAPRES-TTS-1) 0 1 mg/24 hr Place 1 patch on the skin once a week, Historical Med      famotidine (PEPCID) 40 MG tablet Take 40 mg by mouth, Historical Med      ondansetron (ZOFRAN) 4 mg tablet Take by mouth, Historical Med      nicotine polacrilex (NICORETTE) 2 mg gum Chew 1 each every 2 (two) hours as needed for smoking cessation, Starting Mon 1/22/2018, Print               PDMP Review     None          ED Provider  Electronically Signed by           Cleave Nageotte, MD  04/08/21 9743

## 2021-04-08 NOTE — DISCHARGE INSTRUCTIONS
Prednisone 40mg daily with food  Famotidine 2-20mg daily to protect stomach from prednisone and NSAIDs  Tylenol 650mg every 6 hours as needed for pain, fever (max 3000mg in 24 hours)   Aleve 2 tabs twice daily with food OR ibuprofen 200-800mg every 8 hours with food as needed for pain   Return with any fever, loss of bowel or bladder control weakness or any new or worsening symptoms

## 2021-04-08 NOTE — TELEPHONE ENCOUNTER
Voice mail/message left requesting patient to return call to "[employer] [facility] [address] program including our hours of business and phone number  Kindly asked to LM with Full Name,  and Reminded CB will come from a non- number as the nurses are working remotely/off-site      Referral deferred for f/u attempt per protocol    Note-home # rings, but no option to LM-use cell number

## 2021-04-08 NOTE — ED NOTES
Patient refusing IV access at this time, stating I can't do IV's Ill freak out  Explained to him the need and purpose for the IV and he continued to refuse  Doctor made aware        Saranya Garcia RN  04/08/21 0005

## 2021-04-09 LAB
ATRIAL RATE: 66 BPM
PR INTERVAL: 208 MS
QRS AXIS: 71 DEGREES
QRSD INTERVAL: 76 MS
QT INTERVAL: 352 MS
QTC INTERVAL: 369 MS
T WAVE AXIS: 60 DEGREES
VENTRICULAR RATE: 66 BPM

## 2021-04-09 PROCEDURE — 93010 ELECTROCARDIOGRAM REPORT: CPT | Performed by: INTERNAL MEDICINE

## 2021-04-13 ENCOUNTER — TELEPHONE (OUTPATIENT)
Dept: PHYSICAL THERAPY | Facility: OTHER | Age: 26
End: 2021-04-13

## 2021-04-13 NOTE — TELEPHONE ENCOUNTER
Voice mail/message left requesting patient to return call to [facility] [employer] program including our hours of business and phone number  Kindly asked to LM with Full Name,  and Reminded CB will come from a non- number as the nurses are working remotely/off-site      Referral deferred for f/u attempt per protocol

## 2021-04-13 NOTE — TELEPHONE ENCOUNTER
Nurse reached out to discuss recent referral to [id number] program-2nd attempt  Home phone rings, but no VMB to LM  Will attempt using mobile

## 2021-04-20 ENCOUNTER — TELEPHONE (OUTPATIENT)
Dept: PHYSICAL THERAPY | Facility: OTHER | Age: 26
End: 2021-04-20

## 2021-04-20 NOTE — TELEPHONE ENCOUNTER
Voice mail/message left requesting patient to return call to [employer] program including our hours of business and phone number  Kindly asked to LM with Full Name,  and Reminded CB will come from a non- number as the nurses are working remotely/off-site      Referral closed per protocol

## 2022-08-22 ENCOUNTER — HOSPITAL ENCOUNTER (EMERGENCY)
Facility: HOSPITAL | Age: 27
Discharge: LEFT BEFORE DISCHARGE | End: 2022-08-22
Attending: EMERGENCY MEDICINE | Admitting: EMERGENCY MEDICINE
Payer: COMMERCIAL

## 2022-08-22 ENCOUNTER — APPOINTMENT (OUTPATIENT)
Dept: RADIOLOGY | Facility: HOSPITAL | Age: 27
End: 2022-08-22
Payer: COMMERCIAL

## 2022-08-22 VITALS
RESPIRATION RATE: 20 BRPM | DIASTOLIC BLOOD PRESSURE: 76 MMHG | BODY MASS INDEX: 23.06 KG/M2 | WEIGHT: 170 LBS | OXYGEN SATURATION: 97 % | SYSTOLIC BLOOD PRESSURE: 127 MMHG | TEMPERATURE: 98 F | HEART RATE: 77 BPM

## 2022-08-22 DIAGNOSIS — R07.9 CHEST PAIN, UNSPECIFIED TYPE: Primary | ICD-10-CM

## 2022-08-22 LAB
ALBUMIN SERPL BCP-MCNC: 4.2 G/DL (ref 3.5–5)
ALP SERPL-CCNC: 64 U/L (ref 46–116)
ALT SERPL W P-5'-P-CCNC: 29 U/L (ref 12–78)
ANION GAP SERPL CALCULATED.3IONS-SCNC: 10 MMOL/L (ref 4–13)
AST SERPL W P-5'-P-CCNC: 19 U/L (ref 5–45)
BASOPHILS # BLD AUTO: 0.03 THOUSANDS/ΜL (ref 0–0.1)
BASOPHILS NFR BLD AUTO: 1 % (ref 0–1)
BILIRUB SERPL-MCNC: 0.28 MG/DL (ref 0.2–1)
BUN SERPL-MCNC: 16 MG/DL (ref 5–25)
CALCIUM SERPL-MCNC: 8.9 MG/DL (ref 8.3–10.1)
CARDIAC TROPONIN I PNL SERPL HS: <2 NG/L
CHLORIDE SERPL-SCNC: 103 MMOL/L (ref 96–108)
CO2 SERPL-SCNC: 26 MMOL/L (ref 21–32)
CREAT SERPL-MCNC: 0.83 MG/DL (ref 0.6–1.3)
EOSINOPHIL # BLD AUTO: 0.1 THOUSAND/ΜL (ref 0–0.61)
EOSINOPHIL NFR BLD AUTO: 2 % (ref 0–6)
ERYTHROCYTE [DISTWIDTH] IN BLOOD BY AUTOMATED COUNT: 11.9 % (ref 11.6–15.1)
GFR SERPL CREATININE-BSD FRML MDRD: 121 ML/MIN/1.73SQ M
GLUCOSE SERPL-MCNC: 111 MG/DL (ref 65–140)
HCT VFR BLD AUTO: 40.9 % (ref 36.5–49.3)
HGB BLD-MCNC: 14.5 G/DL (ref 12–17)
IMM GRANULOCYTES # BLD AUTO: 0.01 THOUSAND/UL (ref 0–0.2)
IMM GRANULOCYTES NFR BLD AUTO: 0 % (ref 0–2)
LIPASE SERPL-CCNC: 87 U/L (ref 73–393)
LYMPHOCYTES # BLD AUTO: 1.35 THOUSANDS/ΜL (ref 0.6–4.47)
LYMPHOCYTES NFR BLD AUTO: 31 % (ref 14–44)
MCH RBC QN AUTO: 33.1 PG (ref 26.8–34.3)
MCHC RBC AUTO-ENTMCNC: 35.5 G/DL (ref 31.4–37.4)
MCV RBC AUTO: 93 FL (ref 82–98)
MONOCYTES # BLD AUTO: 0.41 THOUSAND/ΜL (ref 0.17–1.22)
MONOCYTES NFR BLD AUTO: 10 % (ref 4–12)
NEUTROPHILS # BLD AUTO: 2.42 THOUSANDS/ΜL (ref 1.85–7.62)
NEUTS SEG NFR BLD AUTO: 56 % (ref 43–75)
NRBC BLD AUTO-RTO: 0 /100 WBCS
PLATELET # BLD AUTO: 235 THOUSANDS/UL (ref 149–390)
PMV BLD AUTO: 8.7 FL (ref 8.9–12.7)
POTASSIUM SERPL-SCNC: 3.6 MMOL/L (ref 3.5–5.3)
PROT SERPL-MCNC: 7.1 G/DL (ref 6.4–8.4)
RBC # BLD AUTO: 4.38 MILLION/UL (ref 3.88–5.62)
SODIUM SERPL-SCNC: 139 MMOL/L (ref 135–147)
WBC # BLD AUTO: 4.32 THOUSAND/UL (ref 4.31–10.16)

## 2022-08-22 PROCEDURE — 93005 ELECTROCARDIOGRAM TRACING: CPT

## 2022-08-22 PROCEDURE — 83690 ASSAY OF LIPASE: CPT | Performed by: EMERGENCY MEDICINE

## 2022-08-22 PROCEDURE — 80053 COMPREHEN METABOLIC PANEL: CPT | Performed by: EMERGENCY MEDICINE

## 2022-08-22 PROCEDURE — 71045 X-RAY EXAM CHEST 1 VIEW: CPT

## 2022-08-22 PROCEDURE — 36415 COLL VENOUS BLD VENIPUNCTURE: CPT | Performed by: EMERGENCY MEDICINE

## 2022-08-22 PROCEDURE — 84484 ASSAY OF TROPONIN QUANT: CPT | Performed by: EMERGENCY MEDICINE

## 2022-08-22 PROCEDURE — 85025 COMPLETE CBC W/AUTO DIFF WBC: CPT | Performed by: EMERGENCY MEDICINE

## 2022-08-22 PROCEDURE — 99285 EMERGENCY DEPT VISIT HI MDM: CPT

## 2022-08-22 PROCEDURE — 99285 EMERGENCY DEPT VISIT HI MDM: CPT | Performed by: EMERGENCY MEDICINE

## 2022-08-22 RX ORDER — NITROGLYCERIN 0.4 MG/1
0.4 TABLET SUBLINGUAL
Status: DISCONTINUED | OUTPATIENT
Start: 2022-08-22 | End: 2022-08-22 | Stop reason: HOSPADM

## 2022-08-22 RX ORDER — ASPIRIN 81 MG/1
324 TABLET, CHEWABLE ORAL ONCE
Status: COMPLETED | OUTPATIENT
Start: 2022-08-22 | End: 2022-08-22

## 2022-08-22 RX ADMIN — ASPIRIN 81 MG CHEWABLE TABLET 324 MG: 81 TABLET CHEWABLE at 15:29

## 2022-08-22 RX ADMIN — SODIUM CHLORIDE 1000 ML: 0.9 INJECTION, SOLUTION INTRAVENOUS at 15:30

## 2022-08-22 NOTE — ED PROVIDER NOTES
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  He states that worst it was 6/10, currently it is 4/10  Patient does not smoke or use illicit drugs  No prior medical history, not currently on any medications  History provided by:  Patient   used: No    Chest Pain  Pain location:  Substernal area  Pain quality: dull    Pain radiates to:  Does not radiate  Pain radiates to the back: no    Pain severity:  Mild (4/10)  Onset quality:  Gradual  Duration:  5 hours  Timing:  Constant  Progression:  Unchanged  Chronicity:  New  Relieved by:  Nothing  Worsened by:  Nothing tried  Ineffective treatments:  None tried  Associated symptoms: no abdominal pain, no altered mental status, no anxiety, no back pain, no claudication, no cough, no diaphoresis, no dizziness, no dysphagia, no fatigue, no fever, no headache, no heartburn, no nausea, no palpitations, no shortness of breath, no syncope and not vomiting    Risk factors: no diabetes mellitus, no hypertension and no smoking        Prior to Admission Medications   Prescriptions Last Dose Informant Patient Reported? Taking?    amphetamine-dextroamphetamine (ADDERALL XR) 20 MG 24 hr capsule   Yes No   Sig: Take 20 mg by mouth   amphetamine-dextroamphetamine (ADDERALL) 20 mg tablet   Yes No   Sig: Take 20 mg by mouth   buprenorphine-naloxone (SUBOXONE) 2-0 5 mg per SL tablet   Yes No   Sig: Place 0 5 tablets under the tongue daily   buprenorphine-naloxone (Suboxone) 8-2 mg   Yes No   Sig: Take by mouth   cloNIDine (CATAPRES-TTS-1) 0 1 mg/24 hr   Yes No   Sig: Place 1 patch on the skin once a week   famotidine (PEPCID) 40 MG tablet   Yes No   Sig: Take 40 mg by mouth   nicotine polacrilex (NICORETTE) 2 mg gum   No No   Sig: Chew 1 each every 2 (two) hours as needed for smoking cessation   Patient not taking: Reported on 2021   ondansetron (ZOFRAN) 4 mg tablet   Yes No   Sig: Take by mouth   predniSONE 20 mg tablet   No No   Simg (2-20mg tabs) by mouth daily with food for 5 days      Facility-Administered Medications: None       Past Medical History:   Diagnosis Date    Addiction to drug ([place] [id number] )     Anxiety     Chronic pain disorder     Depression     Heart disease     pt states "valvular heart disease"    Hepatitis C 2018    Muscle spasm     Psychiatric illness     Sciatica     Scoliosis     Sleep difficulties     Substance abuse ([place] [address] )     Suicide attempt ([place] [address] )        Past Surgical History:   Procedure Laterality Date    WISDOM TOOTH EXTRACTION         History reviewed  No pertinent family history  I have reviewed and agree with the history as documented  E-Cigarette/Vaping    E-Cigarette Use Never User      E-Cigarette/Vaping Substances     Social History     Tobacco Use    Smoking status: Former Smoker     Packs/day: 1 00     Quit date: 2019     Years since quitting: 3 3    Smokeless tobacco: Never Used   Vaping Use    Vaping Use: Never used   Substance Use Topics    Alcohol use: No    Drug use: Not Currently     Types: Heroin, Benzodiazepines, Other     Comment: stopped using benzo and prescription pain medication 2018       Review of Systems   Constitutional: Negative for chills, diaphoresis, fatigue and fever  HENT: Negative for ear pain, hearing loss, sore throat, trouble swallowing and voice change  Eyes: Negative for pain and discharge  Respiratory: Negative for cough, shortness of breath and wheezing  Cardiovascular: Positive for chest pain  Negative for palpitations, claudication and syncope     Gastrointestinal: Negative for abdominal pain, blood in stool, constipation, diarrhea, heartburn, nausea and vomiting  Genitourinary: Negative for dysuria, flank pain, frequency and hematuria  Musculoskeletal: Negative for back pain, joint swelling, neck pain and neck stiffness  Skin: Negative for rash and wound  Neurological: Negative for dizziness, seizures, syncope, facial asymmetry and headaches  Psychiatric/Behavioral: Negative for hallucinations, self-injury and suicidal ideas  All other systems reviewed and are negative  Physical Exam  Physical Exam  Vitals and nursing note reviewed  Constitutional:       General: He is not in acute distress  Appearance: He is well-developed  HENT:      Head: Normocephalic and atraumatic  Right Ear: External ear normal       Left Ear: External ear normal    Eyes:      General: No scleral icterus  Right eye: No discharge  Left eye: No discharge  Extraocular Movements: Extraocular movements intact  Conjunctiva/sclera: Conjunctivae normal    Cardiovascular:      Rate and Rhythm: Normal rate and regular rhythm  Heart sounds: Normal heart sounds  No murmur heard  Pulmonary:      Effort: Pulmonary effort is normal       Breath sounds: Normal breath sounds  No wheezing or rales  Abdominal:      General: Bowel sounds are normal  There is no distension  Palpations: Abdomen is soft  Tenderness: There is no abdominal tenderness  There is no guarding or rebound  Musculoskeletal:         General: No deformity  Normal range of motion  Cervical back: Normal range of motion and neck supple  Skin:     General: Skin is warm and dry  Findings: No rash  Neurological:      General: No focal deficit present  Mental Status: He is alert and oriented to person, place, and time  Cranial Nerves: No cranial nerve deficit  Psychiatric:         Mood and Affect: Mood normal          Behavior: Behavior normal          Thought Content:  Thought content normal          Judgment: Judgment normal          Vital Signs  ED Triage Vitals [08/22/22 1457]   Temperature Pulse Respirations Blood Pressure SpO2   98 °F (36 7 °C) 77 16 135/81 97 %      Temp src Heart Rate Source Patient Position - Orthostatic VS BP Location FiO2 (%)   -- -- Lying Right arm --      Pain Score       4           Vitals:    08/22/22 1457   BP: 135/81   Pulse: 77   Patient Position - Orthostatic VS: Lying         Visual Acuity      ED Medications  Medications   sodium chloride 0 9 % bolus 1,000 mL (1,000 mL Intravenous New Bag 8/22/22 1530)   nitroglycerin (NITROSTAT) SL tablet 0 4 mg (has no administration in time range)   aspirin chewable tablet 324 mg (324 mg Oral Given 8/22/22 1529)       Diagnostic Studies  Results Reviewed     Procedure Component Value Units Date/Time    HS Troponin I 4hr [[id number]]     Lab Status: No result Specimen: Blood     Comprehensive metabolic panel [[id number]] Collected: 08/22/22 1504    Lab Status: Final result Specimen: Blood from Arm, Right Updated: 08/22/22 1540     Sodium 139 mmol/L      Potassium 3 6 mmol/L      Chloride 103 mmol/L      CO2 26 mmol/L      ANION GAP 10 mmol/L      BUN 16 mg/dL      Creatinine 0 83 mg/dL      Glucose 111 mg/dL      Calcium 8 9 mg/dL      AST 19 U/L      ALT 29 U/L      Alkaline Phosphatase 64 U/L      Total Protein 7 1 g/dL      Albumin 4 2 g/dL      Total Bilirubin 0 28 mg/dL      eGFR 121 ml/min/1 73sq m     Narrative:      Richard guidelines for Chronic Kidney Disease (CKD):     Stage 1 with normal or high GFR (GFR > 90 mL/min/1 73 square meters)    Stage 2 Mild CKD (GFR = 60-89 mL/min/1 73 square meters)    Stage 3A Moderate CKD (GFR = 45-59 mL/min/1 73 square meters)    Stage 3B Moderate CKD (GFR = 30-44 mL/min/1 73 square meters)    Stage 4 Severe CKD (GFR = 15-29 mL/min/1 73 square meters)    Stage 5 End Stage CKD (GFR <15 mL/min/1 73 square meters)  Note: GFR calculation is accurate only with a steady state creatinine    Lipase [[id number]]  (Normal) Collected: 08/22/22 1504    Lab Status: Final result Specimen: Blood from Arm, Right Updated: 08/22/22 1540     Lipase 87 u/L     HS Troponin 0hr (reflex protocol) [[id number]]  (Normal) Collected: 08/22/22 1504    Lab Status: Final result Specimen: Blood from Arm, Right Updated: 08/22/22 1537     hs TnI 0hr <2 ng/L     HS Troponin I 2hr [[id number]]     Lab Status: No result Specimen: Blood     CBC and differential [[id number]]  (Abnormal) Collected: 08/22/22 1504    Lab Status: Final result Specimen: Blood from Arm, Right Updated: 08/22/22 1510     WBC 4 32 Thousand/uL      RBC 4 38 Million/uL      Hemoglobin 14 5 g/dL      Hematocrit 40 9 %      MCV 93 fL      MCH 33 1 pg      MCHC 35 5 g/dL      RDW 11 9 %      MPV 8 7 fL      Platelets 751 Thousands/uL      nRBC 0 /100 WBCs      Neutrophils Relative 56 %      Immat GRANS % 0 %      Lymphocytes Relative 31 %      Monocytes Relative 10 %      Eosinophils Relative 2 %      Basophils Relative 1 %      Neutrophils Absolute 2 42 Thousands/µL      Immature Grans Absolute 0 01 Thousand/uL      Lymphocytes Absolute 1 35 Thousands/µL      Monocytes Absolute 0 41 Thousand/µL      Eosinophils Absolute 0 10 Thousand/µL      Basophils Absolute 0 03 Thousands/µL                  XR chest portable    (Results Pending)              Procedures  ECG 12 Lead Documentation Only    Date/Time: 8/22/2022 3:01 PM  Performed by: Josephine Mansfield MD  Authorized by: Josephine Mansfield MD     ECG reviewed by me, the ED Provider: yes    Patient location:  ED  Previous ECG:     Previous ECG:  Compared to current    Similarity:  No change  Interpretation:     Interpretation: normal    Rate:     ECG rate:  77    ECG rate assessment: normal    Rhythm:     Rhythm: sinus rhythm    Ectopy:     Ectopy: none    QRS:     QRS axis:  Normal    QRS intervals:  Normal  Conduction:     Conduction: normal    ST segments:     ST segments:  Normal  T waves:     T waves: normal               ED Course  ED Course as of 08/22/22 1607   Mon Aug 22, 2022   1602 Prior to having 2nd troponin drawn, patient requesting to leave the hospital at this time and be discharge  I explained to the patient that he should stay for 2nd troponin to definitively rule out ACS  I explained that the risks of leaving at this time could include worsening of pain, evolving heart attack, stroke, permanent disability or even death  The patient is awake and alert and oriented x4 without alteration mental status and able to understand my concerns  Despite this he chooses to leave against medical advice at this time and refuses to have repeat blood work performed  He has signed an AMA form  I have advised him he is free to return to the hospital for re-evaluation at any time and that he should follow-up as soon as possible with his primary care physician  He is agreeable               HEART Risk Score    Flowsheet Row Most Recent Value   Heart Score Risk Calculator    History 0 Filed at: 08/22/2022 1557   ECG 0 Filed at: 08/22/2022 1557   Age 0 Filed at: 08/22/2022 1557   Risk Factors 1 Filed at: 08/22/2022 1557   Troponin 0 Filed at: 08/22/2022 1557   HEART Score 1 Filed at: 08/22/2022 1557                                      MDM    Disposition  Final diagnoses:   Chest pain, unspecified type     Time reflects when diagnosis was documented in both MDM as applicable and the Disposition within this note     Time User Action Codes Description Comment    8/22/2022  4:04 PM Balta December Add [R07 9] Chest pain, unspecified type       ED Disposition     ED Disposition   AMA    Condition   --    Date/Time   Mon Aug 22, 2022  4:04 PM    Comment   Date: 8/22/2022  Patient: Sujatha Hernandez  Admitted: 8/22/2022  2:55 PM  Attending Provider: Jordyn Pro MD    Sujatha eHrnandez or his authorized caregiver has made the decision for the patient to leave the emergency department against the a dvice of his attending physician  He or his authorized caregiver has been informed and understands the inherent risks, including death, evolving heart attack, permanent disability, stroke  He or his authorized caregiver has decided to accept the res ponsibility for this decision  Tim Knight and all necessary parties have been advised that he may return for further evaluation or treatment  His condition at time of discharge was stable  Tim Knight had current vital signs as follows:   /81 (BP Location: Right arm)   Pulse 77   Temp 98 °F (36 7 °C)   Resp 16   Wt 77 1 kg (170 lb)            Follow-up Information     Follow up With Specialties Details Why Contact Info    Alyssa Quevedo MD Family Medicine In 1 day  [address] [address] [phone]            Patient's Medications   Discharge Prescriptions    No medications on file       No discharge procedures on file      PDMP Review     None          ED Provider  Electronically Signed by           Francisco Cockayne, MD  08/22/22 5285

## 2022-08-23 LAB
ATRIAL RATE: 77 BPM
P AXIS: 74 DEGREES
PR INTERVAL: 184 MS
QRS AXIS: 75 DEGREES
QRSD INTERVAL: 90 MS
QT INTERVAL: 370 MS
QTC INTERVAL: 418 MS
T WAVE AXIS: 48 DEGREES
VENTRICULAR RATE: 77 BPM